# Patient Record
Sex: MALE | Race: OTHER | NOT HISPANIC OR LATINO | URBAN - METROPOLITAN AREA
[De-identification: names, ages, dates, MRNs, and addresses within clinical notes are randomized per-mention and may not be internally consistent; named-entity substitution may affect disease eponyms.]

---

## 2017-01-03 ENCOUNTER — EMERGENCY (EMERGENCY)
Facility: HOSPITAL | Age: 42
LOS: 1 days | Discharge: HOME | End: 2017-01-03
Attending: EMERGENCY MEDICINE
Payer: SELF-PAY

## 2017-01-03 VITALS
WEIGHT: 184.97 LBS | TEMPERATURE: 98 F | RESPIRATION RATE: 16 BRPM | OXYGEN SATURATION: 98 % | HEART RATE: 86 BPM | SYSTOLIC BLOOD PRESSURE: 140 MMHG | HEIGHT: 70 IN | DIASTOLIC BLOOD PRESSURE: 80 MMHG

## 2017-01-03 PROCEDURE — 71010: CPT | Mod: 26

## 2017-01-03 PROCEDURE — 70450 CT HEAD/BRAIN W/O DYE: CPT

## 2017-01-03 PROCEDURE — 71045 X-RAY EXAM CHEST 1 VIEW: CPT

## 2017-01-03 PROCEDURE — 70450 CT HEAD/BRAIN W/O DYE: CPT | Mod: 26

## 2017-01-03 PROCEDURE — 72125 CT NECK SPINE W/O DYE: CPT

## 2017-01-03 PROCEDURE — 99284 EMERGENCY DEPT VISIT MOD MDM: CPT

## 2017-01-03 PROCEDURE — 72125 CT NECK SPINE W/O DYE: CPT | Mod: 26

## 2017-01-03 PROCEDURE — 99284 EMERGENCY DEPT VISIT MOD MDM: CPT | Mod: 25

## 2017-01-03 RX ORDER — ACETAMINOPHEN 500 MG
650 TABLET ORAL ONCE
Qty: 0 | Refills: 0 | Status: COMPLETED | OUTPATIENT
Start: 2017-01-03 | End: 2017-01-03

## 2017-01-03 RX ADMIN — Medication 650 MILLIGRAM(S): at 21:52
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
## 2017-01-03 NOTE — ED PROVIDER NOTE - MEDICAL DECISION MAKING DETAILS
plan if head ct and neck neg and cxr normal plan of r d/c home . concussion talk was given return to ed for intractable HA persitent vomiting or new onset motor or senory deficits. plan if head ct and neck neg and cxr normal plan of r d/c home . concussion talk was given return to ed for intractable HA persitent vomiting or new onset motor or senory deficits.  also had long talk with this patient about need ot f.u read radiology call prominent lung lymph nodes rule out lympohoma vs post infectious this week withotu fail . risk discussed including not detecting cancer if he does not follow this up this week and he voices understanfing. he was provided with clinci f.u for this testing on outpt level he denies fever or weight loss
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
## 2017-01-03 NOTE — ED PROVIDER NOTE - PHYSICAL EXAMINATION
msk/neuro: CN II - XII intact no senory deficits 5/5 ms x 4 extremeties no alteraralizing deficits 2+ dtr globally neg babinski nml cap refill no ataxic gait

## 2019-02-27 ENCOUNTER — EMERGENCY (EMERGENCY)
Facility: HOSPITAL | Age: 44
LOS: 1 days | Discharge: HOME | End: 2019-02-27
Attending: EMERGENCY MEDICINE
Payer: MEDICAID

## 2019-02-27 VITALS
HEART RATE: 76 BPM | WEIGHT: 181 LBS | SYSTOLIC BLOOD PRESSURE: 115 MMHG | TEMPERATURE: 98 F | OXYGEN SATURATION: 98 % | RESPIRATION RATE: 18 BRPM | DIASTOLIC BLOOD PRESSURE: 77 MMHG | HEIGHT: 66 IN

## 2019-02-27 PROCEDURE — 99284 EMERGENCY DEPT VISIT MOD MDM: CPT

## 2019-02-27 PROCEDURE — 70450 CT HEAD/BRAIN W/O DYE: CPT

## 2019-02-27 PROCEDURE — 96374 THER/PROPH/DIAG INJ IV PUSH: CPT

## 2019-02-27 PROCEDURE — 99284 EMERGENCY DEPT VISIT MOD MDM: CPT | Mod: 25

## 2019-02-27 PROCEDURE — 72125 CT NECK SPINE W/O DYE: CPT

## 2019-02-27 RX ORDER — METOCLOPRAMIDE HCL 10 MG
10 TABLET ORAL ONCE
Qty: 0 | Refills: 0 | Status: COMPLETED | OUTPATIENT
Start: 2019-02-27 | End: 2019-02-27

## 2019-02-27 RX ADMIN — Medication 1 TABLET(S): at 23:44

## 2019-02-27 RX ADMIN — Medication 10 MILLIGRAM(S): at 23:44

## 2019-02-27 NOTE — ED PROVIDER NOTE - OBJECTIVE STATEMENT
44 y/o M, with no pertinent medical hx, presents to the ED c/o headache and dizziness, onset yesterday.  Pt notes that 2 days ago he slipped and fell down 8 stairs.  Pt states that during the fall he hit his head.  Denies LOC.  Pt states that since the fall he has been c/o headache, dizziness, and nausea.  Sx are worse with exposure to light.  Denies fever, chills, visual changes, chest pain, SOB, cough, abd pain, vomiting, diarrhea, back pain, neck pain, numbness, or tingling.

## 2019-02-27 NOTE — ED PROVIDER NOTE - NEUROLOGICAL, MLM
Alert and oriented, no focal deficits, no motor or sensory deficits.  Cranial nerves II-XII intact, no nystagmus, sensation intact, reflexes intact, 5/5 strength to extremities x4

## 2019-02-27 NOTE — ED PROVIDER NOTE - NEURO NEGATIVE STATEMENT, MLM
no loss of consciousness, no gait abnormality, + headache, no sensory deficits, and no weakness. +dizziness

## 2019-02-27 NOTE — ED ADULT NURSE NOTE - OBJECTIVE STATEMENT
pt alert and oriented x4 drove to hospital c/o HA, dizzyness and light sensitivity. pt states he fell down 7 wood steps yesterday and hit his head but has not had symptoms until now. respirations even unlabored. pt c/o pain 9/10. pt denies blood thinners. pt educated on plan of care, pt able to successfully teach back plan of care to RN, RN will continue to reeducate pt during hospital stay.
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
## 2019-02-28 VITALS
DIASTOLIC BLOOD PRESSURE: 63 MMHG | SYSTOLIC BLOOD PRESSURE: 98 MMHG | RESPIRATION RATE: 20 BRPM | OXYGEN SATURATION: 99 % | TEMPERATURE: 98 F | HEART RATE: 66 BPM

## 2019-02-28 PROCEDURE — 70450 CT HEAD/BRAIN W/O DYE: CPT | Mod: 26

## 2019-02-28 PROCEDURE — 72125 CT NECK SPINE W/O DYE: CPT | Mod: 26

## 2019-02-28 RX ORDER — METOCLOPRAMIDE HCL 10 MG
1 TABLET ORAL
Qty: 15 | Refills: 0 | OUTPATIENT
Start: 2019-02-28 | End: 2019-03-04

## 2019-03-16 ENCOUNTER — EMERGENCY (EMERGENCY)
Facility: HOSPITAL | Age: 44
LOS: 1 days | Discharge: HOME | End: 2019-03-16
Attending: EMERGENCY MEDICINE
Payer: MEDICAID

## 2019-03-16 VITALS
WEIGHT: 197.98 LBS | RESPIRATION RATE: 18 BRPM | OXYGEN SATURATION: 98 % | HEIGHT: 66 IN | SYSTOLIC BLOOD PRESSURE: 124 MMHG | DIASTOLIC BLOOD PRESSURE: 82 MMHG | HEART RATE: 67 BPM | TEMPERATURE: 98 F

## 2019-03-16 PROCEDURE — 99283 EMERGENCY DEPT VISIT LOW MDM: CPT | Mod: 25

## 2019-03-17 VITALS
SYSTOLIC BLOOD PRESSURE: 125 MMHG | DIASTOLIC BLOOD PRESSURE: 78 MMHG | RESPIRATION RATE: 18 BRPM | TEMPERATURE: 98 F | HEART RATE: 64 BPM | OXYGEN SATURATION: 98 %

## 2019-03-17 PROCEDURE — 99283 EMERGENCY DEPT VISIT LOW MDM: CPT

## 2019-03-17 RX ORDER — METOCLOPRAMIDE HCL 10 MG
10 TABLET ORAL ONCE
Qty: 0 | Refills: 0 | Status: COMPLETED | OUTPATIENT
Start: 2019-03-17 | End: 2019-03-17

## 2019-03-17 RX ADMIN — Medication 10 MILLIGRAM(S): at 04:57

## 2019-03-17 NOTE — ED ADULT NURSE NOTE - OBJECTIVE STATEMENT
patient states that fell last week no follow up care and states needs new medication for pain patient stes continues to have ight sensativity dizziness and headache

## 2019-03-17 NOTE — ED ADULT NURSE NOTE - CAS ELECT INFOMATION PROVIDED
DC instructions/patient given d/c instructions and to follow up with pmd and concussion clinic for further care

## 2019-03-17 NOTE — ED PROVIDER NOTE - CLINICAL SUMMARY MEDICAL DECISION MAKING FREE TEXT BOX
Discussed with pt that he will need to see his pcp for medications, will give reglan and d/c pt with proper follow up

## 2019-03-17 NOTE — ED PROVIDER NOTE - OBJECTIVE STATEMENT
44 y/o male who was recently seen on 2/28 for slip and fall hit the back of his head diagnosed with concussion and d/'d on fioricet and reglan presents to the ED for refills of the meds. States he did not follow up with pcp or neurologist. States he now lives in [facility] and drove to the [facility] for refills of his meds. Pt states his headaches will come occasionally but not as severe. States he would rather take fioricet rather then ibuprofen because it "goes away quicker". Pt seen sleeping in the ED, does not appear to be in any distress.  Denies lightheadedness, dizziness, cp, shortness of breath, blurred vision, loss of vision, visual changes with light, numbness, tingling, fevers

## 2019-03-17 NOTE — ED PROVIDER NOTE - ATTENDING CONTRIBUTION TO CARE
43-year-old With history of headaches has been on Fioricet presents to [facility] with complaints of headache requesting fiorcet   prescription. Outpatient follow-up with pain management and neurologist advised.Plan aNeurology examinationPain control in emergency room.Advice and educated patient prescriptions are not refilled in emergency room.

## 2019-03-21 PROBLEM — Z00.00 ENCOUNTER FOR PREVENTIVE HEALTH EXAMINATION: Status: ACTIVE | Noted: 2019-03-21

## 2019-03-22 ENCOUNTER — APPOINTMENT (OUTPATIENT)
Dept: INTERNAL MEDICINE | Facility: CLINIC | Age: 44
End: 2019-03-22
Payer: SELF-PAY

## 2019-03-22 VITALS — DIASTOLIC BLOOD PRESSURE: 80 MMHG | HEART RATE: 70 BPM | SYSTOLIC BLOOD PRESSURE: 110 MMHG | RESPIRATION RATE: 12 BRPM

## 2019-03-22 VITALS — BODY MASS INDEX: 31.5 KG/M2 | HEIGHT: 66 IN | WEIGHT: 196 LBS

## 2019-03-22 VITALS — DIASTOLIC BLOOD PRESSURE: 85 MMHG | SYSTOLIC BLOOD PRESSURE: 115 MMHG

## 2019-03-22 DIAGNOSIS — R51 HEADACHE: ICD-10-CM

## 2019-03-22 DIAGNOSIS — Z82.49 FAMILY HISTORY OF ISCHEMIC HEART DISEASE AND OTHER DISEASES OF THE CIRCULATORY SYSTEM: ICD-10-CM

## 2019-03-22 DIAGNOSIS — E04.1 NONTOXIC SINGLE THYROID NODULE: ICD-10-CM

## 2019-03-22 DIAGNOSIS — R42 DIZZINESS AND GIDDINESS: ICD-10-CM

## 2019-03-22 PROCEDURE — 36415 COLL VENOUS BLD VENIPUNCTURE: CPT

## 2019-03-22 PROCEDURE — 99203 OFFICE O/P NEW LOW 30 MIN: CPT | Mod: 25

## 2019-03-22 NOTE — ASSESSMENT
[FreeTextEntry1] : Slip and fall 2-19 with ?concussion to the back of the head.  \par Dizziness and lack of confidence when driving.  Mostly ok when not driving.  ABle to have returned to gym and feels good with sweating/exertion.  Entirely non focal neuro exam and normal MS.  Consider re imaging him.  It is possible he has had a SDH but no nausea vomiting or visual change\par Perhaps migraine is an issue here but non focal and no aura. No vision changes. Says the light bothers him at night since incident. \par Thyroid hypodense lesion has enlarged from 1/2017 CT to 2/28/19 CT.  Will need a sono once he has ins. \par Pt says he has no ins and is asking to delay labs or further imaging till 4-19 when he will have coverage.  Advised that he not do this.

## 2019-03-22 NOTE — HEALTH RISK ASSESSMENT
[Fair] :  ~his/her~ mood as fair [Any fall with injury in past year] : Patient reported fall with injury in the past year [1] : 2) Feeling down, depressed, or hopeless for several days (1) [] : No [YearQuit] : 2008 [FreeTextEntry1] : Says he feels depressed since this fall

## 2019-03-22 NOTE — REVIEW OF SYSTEMS
[Negative] : Genitourinary [Chest Pain] : no chest pain [Palpitations] : no palpitations [Lower Ext Edema] : no lower extremity edema [Shortness Of Breath] : no shortness of breath [Wheezing] : no wheezing [Cough] : no cough [Dyspnea on Exertion] : not dyspnea on exertion [Itching] : no itching [Skin Rash] : no skin rash [FreeTextEntry2] : see HPI [FreeTextEntry3] : see HPI [FreeTextEntry7] : see HPI [FreeTextEntry9] : see HPI [de-identified] : see HPI [de-identified] : see HPI

## 2019-03-22 NOTE — HISTORY OF PRESENT ILLNESS
[FreeTextEntry1] : New pt.  Has 2 ER visits.  had slip and fall 2-28.  Imaging no acute fidings except for hypodense lesion neck near thyroid.  Disch on Fiorecet and Reglan\par 2nd visit wrote post concussive sx's and got more Reglan.\marco Says there is a change in his digestion since the original fall.  Feels that it is slow.  \par No nausea vomiting. Describes himself as fit and goes to gym.  \marco Says he is dizzy when he drives.  Says "I don’t feel confident"  Says he has R temporal "numbness"  \par Other wise he is ok and not dizzy except for driving. .  Has returned to gym.  Feels fine when sweating.  \marco Says "I feel lazy and don’t want to do anything" No visual changes but light is bothering him.  Occas problem with sounds.  \marco Has not used Reglan or Fiorecet x 1 week.

## 2019-03-22 NOTE — PHYSICAL EXAM
[No Acute Distress] : no acute distress [Well Nourished] : well nourished [Well Developed] : well developed [PERRL] : pupils equal round and reactive to light [EOMI] : extraocular movements intact [Normal TMs] : both tympanic membranes were normal [Supple] : supple [No Lymphadenopathy] : no lymphadenopathy [Clear to Auscultation] : lungs were clear to auscultation bilaterally [Regular Rhythm] : with a regular rhythm [Normal S1, S2] : normal S1 and S2 [No Murmur] : no murmur heard [No Carotid Bruits] : no carotid bruits [No Edema] : there was no peripheral edema [Normal Appearance] : normal in appearance [Soft] : abdomen soft [Normal Supraclavicular Nodes] : no supraclavicular lymphadenopathy [Normal Posterior Cervical Nodes] : no posterior cervical lymphadenopathy [Normal Anterior Cervical Nodes] : no anterior cervical lymphadenopathy [No Rash] : no rash [No Skin Lesions] : no skin lesions [Normal Gait] : normal gait [Coordination Grossly Intact] : coordination grossly intact [No Focal Deficits] : no focal deficits [Deep Tendon Reflexes (DTR)] : deep tendon reflexes were 2+ and symmetric [Speech Grossly Normal] : speech grossly normal [Memory Grossly Normal] : memory grossly normal [Normal Affect] : the affect was normal [Alert and Oriented x3] : oriented to person, place, and time [Normal Mood] : the mood was normal

## 2019-03-25 ENCOUNTER — RX RENEWAL (OUTPATIENT)
Age: 44
End: 2019-03-25

## 2019-03-25 LAB
ALBUMIN SERPL ELPH-MCNC: 4.7 G/DL
ALP BLD-CCNC: 67 U/L
ALT SERPL-CCNC: 13 U/L
ANION GAP SERPL CALC-SCNC: 15 MMOL/L
AST SERPL-CCNC: 15 U/L
BASOPHILS # BLD AUTO: 0.02 K/UL
BASOPHILS NFR BLD AUTO: 0.3 %
BILIRUB SERPL-MCNC: 0.2 MG/DL
BUN SERPL-MCNC: 16 MG/DL
CALCIUM SERPL-MCNC: 9.5 MG/DL
CHLORIDE SERPL-SCNC: 104 MMOL/L
CO2 SERPL-SCNC: 22 MMOL/L
CREAT SERPL-MCNC: 1.34 MG/DL
EOSINOPHIL # BLD AUTO: 0.28 K/UL
EOSINOPHIL NFR BLD AUTO: 4.8 %
ESTIMATED AVERAGE GLUCOSE: 140 MG/DL
GLUCOSE SERPL-MCNC: 116 MG/DL
HBA1C MFR BLD HPLC: 6.5 %
HCT VFR BLD CALC: 43.4 %
HGB BLD-MCNC: 14.1 G/DL
IMM GRANULOCYTES NFR BLD AUTO: 0.3 %
LYMPHOCYTES # BLD AUTO: 2 K/UL
LYMPHOCYTES NFR BLD AUTO: 34 %
MAN DIFF?: NORMAL
MCHC RBC-ENTMCNC: 27.9 PG
MCHC RBC-ENTMCNC: 32.5 GM/DL
MCV RBC AUTO: 85.8 FL
MONOCYTES # BLD AUTO: 0.61 K/UL
MONOCYTES NFR BLD AUTO: 10.4 %
NEUTROPHILS # BLD AUTO: 2.96 K/UL
NEUTROPHILS NFR BLD AUTO: 50.2 %
PLATELET # BLD AUTO: 269 K/UL
POTASSIUM SERPL-SCNC: 4.3 MMOL/L
PROT SERPL-MCNC: 7.7 G/DL
RBC # BLD: 5.06 M/UL
RBC # FLD: 14.2 %
SODIUM SERPL-SCNC: 141 MMOL/L
T4 SERPL-MCNC: 7.1 UG/DL
TSH SERPL-ACNC: 5.59 UIU/ML
WBC # FLD AUTO: 5.89 K/UL

## 2019-03-25 RX ORDER — MECLIZINE HYDROCHLORIDE 25 MG/1
25 TABLET ORAL 3 TIMES DAILY
Qty: 30 | Refills: 3 | Status: ACTIVE | COMMUNITY
Start: 2019-03-25 | End: 1900-01-01

## 2019-07-21 ENCOUNTER — EMERGENCY (EMERGENCY)
Facility: HOSPITAL | Age: 44
LOS: 1 days | Discharge: HOME | End: 2019-07-21
Attending: EMERGENCY MEDICINE
Payer: MEDICAID

## 2019-07-21 VITALS
WEIGHT: 182.54 LBS | OXYGEN SATURATION: 98 % | TEMPERATURE: 98 F | HEIGHT: 66 IN | HEART RATE: 75 BPM | SYSTOLIC BLOOD PRESSURE: 131 MMHG | DIASTOLIC BLOOD PRESSURE: 77 MMHG | RESPIRATION RATE: 20 BRPM

## 2019-07-21 PROCEDURE — 99284 EMERGENCY DEPT VISIT MOD MDM: CPT

## 2019-07-21 PROCEDURE — 73630 X-RAY EXAM OF FOOT: CPT

## 2019-07-21 PROCEDURE — 73630 X-RAY EXAM OF FOOT: CPT | Mod: 26,50

## 2019-07-21 PROCEDURE — 99283 EMERGENCY DEPT VISIT LOW MDM: CPT

## 2019-07-21 RX ORDER — IBUPROFEN 200 MG
800 TABLET ORAL ONCE
Refills: 0 | Status: DISCONTINUED | OUTPATIENT
Start: 2019-07-21 | End: 2019-07-26

## 2019-07-21 NOTE — ED ADULT TRIAGE NOTE - CHIEF COMPLAINT QUOTE
Pt jumped off of ladder, bare foot, and now both feet hurt more so the left foot, ambulated into triage without difficulty

## 2019-07-21 NOTE — ED STATDOCS - ATTENDING CONTRIBUTION TO CARE
I, Stanislaw George, performed the initial face to face bedside interview with this patient regarding history of present illness, review of symptoms and relevant past medical, social and family history.  I completed an independent physical examination.  I was the initial provider who evaluated this patient. I have signed out the follow up of any pending tests (i.e. labs, radiological studies) to the ACP.  I have communicated the patient’s plan of care and disposition with the ACP.  The history, relevant review of systems, past medical and surgical history, medical decision making, and physical examination was documented by the scribe in my presence and I attest to the accuracy of the documentation.

## 2019-07-21 NOTE — ED STATDOCS - CLINICAL SUMMARY MEDICAL DECISION MAKING FREE TEXT BOX
45 y/o male s/p fall from ladder presents to ED c/o foot pain. Pt is able to ambulate, non-tender to palpation. Will x-ray, likely discharge.

## 2019-07-21 NOTE — ED STATDOCS - OBJECTIVE STATEMENT
43 y/o M pt with no significant PMHx presents to ED c/o bilateral foot pain s/p jumping off a shaky ladder earlier this morning. Pt reports being about 3-4 feet up on a ladder, and jumped off, barefoot, onto a hard surface. Left foot pain is greater than right. He is able to ambulate, and came to the ED for the pain. Denies Pham, CP, SOB, and low back pain. NKDA. No further complaints at this time.

## 2019-10-15 ENCOUNTER — EMERGENCY (EMERGENCY)
Facility: HOSPITAL | Age: 44
LOS: 1 days | Discharge: HOME | End: 2019-10-15
Attending: EMERGENCY MEDICINE
Payer: MEDICAID

## 2019-10-15 VITALS
HEART RATE: 56 BPM | SYSTOLIC BLOOD PRESSURE: 115 MMHG | OXYGEN SATURATION: 99 % | TEMPERATURE: 98 F | RESPIRATION RATE: 18 BRPM | DIASTOLIC BLOOD PRESSURE: 71 MMHG

## 2019-10-15 VITALS
WEIGHT: 179.9 LBS | TEMPERATURE: 98 F | OXYGEN SATURATION: 97 % | DIASTOLIC BLOOD PRESSURE: 82 MMHG | HEART RATE: 92 BPM | SYSTOLIC BLOOD PRESSURE: 124 MMHG | RESPIRATION RATE: 18 BRPM | HEIGHT: 66 IN

## 2019-10-15 LAB
ALBUMIN SERPL ELPH-MCNC: 3.9 G/DL — SIGNIFICANT CHANGE UP (ref 3.3–5.2)
ALP SERPL-CCNC: 57 U/L — SIGNIFICANT CHANGE UP (ref 40–120)
ALT FLD-CCNC: 9 U/L — SIGNIFICANT CHANGE UP
ANION GAP SERPL CALC-SCNC: 12 MMOL/L — SIGNIFICANT CHANGE UP (ref 5–17)
AST SERPL-CCNC: 13 U/L — SIGNIFICANT CHANGE UP
BASOPHILS # BLD AUTO: 0.03 K/UL — SIGNIFICANT CHANGE UP (ref 0–0.2)
BASOPHILS NFR BLD AUTO: 0.4 % — SIGNIFICANT CHANGE UP (ref 0–2)
BILIRUB SERPL-MCNC: <0.2 MG/DL — LOW (ref 0.4–2)
BUN SERPL-MCNC: 20 MG/DL — SIGNIFICANT CHANGE UP (ref 8–20)
CALCIUM SERPL-MCNC: 9 MG/DL — SIGNIFICANT CHANGE UP (ref 8.6–10.2)
CHLORIDE SERPL-SCNC: 102 MMOL/L — SIGNIFICANT CHANGE UP (ref 98–107)
CK SERPL-CCNC: 96 U/L — SIGNIFICANT CHANGE UP (ref 30–200)
CO2 SERPL-SCNC: 24 MMOL/L — SIGNIFICANT CHANGE UP (ref 22–29)
CREAT SERPL-MCNC: 1.19 MG/DL — SIGNIFICANT CHANGE UP (ref 0.5–1.3)
EOSINOPHIL # BLD AUTO: 0.39 K/UL — SIGNIFICANT CHANGE UP (ref 0–0.5)
EOSINOPHIL NFR BLD AUTO: 5.1 % — SIGNIFICANT CHANGE UP (ref 0–6)
GLUCOSE SERPL-MCNC: 112 MG/DL — SIGNIFICANT CHANGE UP (ref 70–115)
HCT VFR BLD CALC: 38.5 % — LOW (ref 39–50)
HGB BLD-MCNC: 13 G/DL — SIGNIFICANT CHANGE UP (ref 13–17)
IMM GRANULOCYTES NFR BLD AUTO: 0.1 % — SIGNIFICANT CHANGE UP (ref 0–1.5)
LYMPHOCYTES # BLD AUTO: 2.33 K/UL — SIGNIFICANT CHANGE UP (ref 1–3.3)
LYMPHOCYTES # BLD AUTO: 30.5 % — SIGNIFICANT CHANGE UP (ref 13–44)
MCHC RBC-ENTMCNC: 28.8 PG — SIGNIFICANT CHANGE UP (ref 27–34)
MCHC RBC-ENTMCNC: 33.8 GM/DL — SIGNIFICANT CHANGE UP (ref 32–36)
MCV RBC AUTO: 85.4 FL — SIGNIFICANT CHANGE UP (ref 80–100)
MONOCYTES # BLD AUTO: 0.66 K/UL — SIGNIFICANT CHANGE UP (ref 0–0.9)
MONOCYTES NFR BLD AUTO: 8.6 % — SIGNIFICANT CHANGE UP (ref 2–14)
NEUTROPHILS # BLD AUTO: 4.23 K/UL — SIGNIFICANT CHANGE UP (ref 1.8–7.4)
NEUTROPHILS NFR BLD AUTO: 55.3 % — SIGNIFICANT CHANGE UP (ref 43–77)
PLATELET # BLD AUTO: 229 K/UL — SIGNIFICANT CHANGE UP (ref 150–400)
POTASSIUM SERPL-MCNC: 4.4 MMOL/L — SIGNIFICANT CHANGE UP (ref 3.5–5.3)
POTASSIUM SERPL-SCNC: 4.4 MMOL/L — SIGNIFICANT CHANGE UP (ref 3.5–5.3)
PROT SERPL-MCNC: 7 G/DL — SIGNIFICANT CHANGE UP (ref 6.6–8.7)
RBC # BLD: 4.51 M/UL — SIGNIFICANT CHANGE UP (ref 4.2–5.8)
RBC # FLD: 12.7 % — SIGNIFICANT CHANGE UP (ref 10.3–14.5)
SODIUM SERPL-SCNC: 138 MMOL/L — SIGNIFICANT CHANGE UP (ref 135–145)
TROPONIN T SERPL-MCNC: <0.01 NG/ML — SIGNIFICANT CHANGE UP (ref 0–0.06)
TROPONIN T SERPL-MCNC: <0.01 NG/ML — SIGNIFICANT CHANGE UP (ref 0–0.06)
WBC # BLD: 7.65 K/UL — SIGNIFICANT CHANGE UP (ref 3.8–10.5)
WBC # FLD AUTO: 7.65 K/UL — SIGNIFICANT CHANGE UP (ref 3.8–10.5)

## 2019-10-15 PROCEDURE — 99236 HOSP IP/OBS SAME DATE HI 85: CPT

## 2019-10-15 PROCEDURE — 71045 X-RAY EXAM CHEST 1 VIEW: CPT | Mod: 26

## 2019-10-15 PROCEDURE — 75574 CT ANGIO HRT W/3D IMAGE: CPT | Mod: 26

## 2019-10-15 PROCEDURE — 85027 COMPLETE CBC AUTOMATED: CPT

## 2019-10-15 PROCEDURE — G0378: CPT

## 2019-10-15 PROCEDURE — 71045 X-RAY EXAM CHEST 1 VIEW: CPT

## 2019-10-15 PROCEDURE — 93010 ELECTROCARDIOGRAM REPORT: CPT

## 2019-10-15 PROCEDURE — 84484 ASSAY OF TROPONIN QUANT: CPT

## 2019-10-15 PROCEDURE — 80053 COMPREHEN METABOLIC PANEL: CPT

## 2019-10-15 PROCEDURE — 93005 ELECTROCARDIOGRAM TRACING: CPT

## 2019-10-15 PROCEDURE — 36415 COLL VENOUS BLD VENIPUNCTURE: CPT

## 2019-10-15 PROCEDURE — 93010 ELECTROCARDIOGRAM REPORT: CPT | Mod: 77

## 2019-10-15 PROCEDURE — 99284 EMERGENCY DEPT VISIT MOD MDM: CPT | Mod: 25

## 2019-10-15 PROCEDURE — 75574 CT ANGIO HRT W/3D IMAGE: CPT

## 2019-10-15 PROCEDURE — 82550 ASSAY OF CK (CPK): CPT

## 2019-10-15 RX ORDER — METOPROLOL TARTRATE 50 MG
100 TABLET ORAL ONCE
Refills: 0 | Status: COMPLETED | OUTPATIENT
Start: 2019-10-15 | End: 2019-10-15

## 2019-10-15 RX ORDER — ATORVASTATIN CALCIUM 80 MG/1
1 TABLET, FILM COATED ORAL
Qty: 30 | Refills: 0
Start: 2019-10-15 | End: 2019-11-13

## 2019-10-15 NOTE — ED ADULT NURSE NOTE - OBJECTIVE STATEMENT
Assumed pt care, pt EKG preformed prior to assuming care, pt is A+Ox4, on cardiac monitor at this time, no acute distress noted. Pt comes in complaining of midsternal chest pain with radiation to L shoulder. Pt states the pain is waxing and waning, worse at times, at this exact time pt states pain is 8/10 and describes pain as "sharp." Pt denies SOB/respirations are spontaneous even and unlabored. Pt is independent and ambulatory with steady gait. Pt denies any medical hx or cardiac hx. Assumed pt care, pt EKG preformed prior to assuming care, pt is A+Ox4, on cardiac monitor at this time, no acute distress noted. Pt comes in complaining of midsternal chest pain with radiation to L shoulder for the last two hours. Pt states the pain is waxing and waning, worse at times, at this exact time pt states pain is 8/10 and describes pain as "sharp." Pt has never seen a cardiologist. Pt denies SOB/respirations are spontaneous even and unlabored. Pt is independent and ambulatory with steady gait. Pt denies any medical hx or cardiac hx.

## 2019-10-15 NOTE — ED ADULT NURSE REASSESSMENT NOTE - NS ED NURSE REASSESS COMMENT FT1
verbal report received from ED RN Adrianna, pt now in CDU for observation
pt care assumed at 0740, no apparent distress noted at this time, charting as noted. pt received Alert and Oriented to person, place, situation and time resting in  bed comfortably. pt denies chest pain at this time. pt is NSR on cardiac monitor. pt awaiting CTA and cardiology consult. pt educated on plan of care, pt able to successfully teach back plan of care to RN, RN will continue to reeducate pt during hospital stay.

## 2019-10-15 NOTE — ED CDU PROVIDER DISPOSITION NOTE - PATIENT PORTAL LINK FT
You can access the FollowMyHealth Patient Portal offered by [facility] by registering at the following website: [email or web address]. By joining [facility]’s FollowMyHealth portal, you will also be able to view your health information using other applications (apps) compatible with our system.

## 2019-10-15 NOTE — ED CDU PROVIDER DISPOSITION NOTE - ATTENDING CONTRIBUTION TO CARE
I, Stanislaw George, performed the initial face to face bedside interview with this patient regarding history of present illness, review of symptoms and relevant past medical, social and family history.  I completed an independent physical examination.  I was the initial provider who evaluated this patient. I have signed out the follow up of any pending tests (i.e. labs, radiological studies) to the ACP.  I have communicated the patient’s plan of care and disposition with the ACP.

## 2019-10-15 NOTE — ED CDU PROVIDER DISPOSITION NOTE - CLINICAL COURSE
Patient is a 45 y/o male with no pmhx presenting with midsternal chest pain that started two hours prior to arrival to the ED, pt stating pain radiating to the left shoulder. Patient denies injuries or trauma, denies cardiac hx. +family history heart attack mother and father in 60s. pt nonsmoker.  Has not had recurrence of CP or shoulder pain.  Troponin negative x 2, EKG no ischemia,  CTA with some plaque formation.  I s/w Dr Meng Otero he reviewed results and recommends lipitor 20 mg daily, and a aspirin 81 mg daily, no exercise and f/u in office 1-2 weeks.

## 2019-10-15 NOTE — ED CDU PROVIDER INITIAL DAY NOTE - OBJECTIVE STATEMENT
Patient is a 45 y/o male with no pmhx presenting with midsternal chest pain that started two hours prior to arrival to the ED, pt stating pain radiating to the left shoulder. Patient denies injuries or trauma, denies cardiac hx. +family history heart attack mother and father in 60s. pt nonsmoker. Patient denies sob, palpitations, recent URI, abd pain, nausea, vomiting, diarrhea.

## 2019-10-15 NOTE — ED PROVIDER NOTE - PHYSICAL EXAMINATION
Const: Awake, alert and oriented. In no acute distress. Well appearing.  HEENT: NC/AT. Moist mucous membranes.  .Neck:. Soft and supple. Full ROM without pain.  Cardiac: +S1/S2. No murmurs. Peripheral pulses 2+ and symmetric. No LE edema.  Resp: Speaking in full sentences. No chest wall tenderness on palpation, No evidence of respiratory distress. No wheezes, rales or rhonchi.  Abd: Soft, non-tender, non-distended. Normal bowel sounds in all 4 quadrants. No guarding or rebound.  Back: Spine midline and non-tender. No CVAT.  Skin: No rashes, abrasions or lacerations.  Lymph: No cervical lymphadenopathy.  Neuro: Awake, alert & oriented x 3. Moves all extremities symmetrically.

## 2019-10-15 NOTE — ED PROVIDER NOTE - ATTENDING CONTRIBUTION TO CARE
45 yo M presents to ED c/o sudden onset of L sided chest pain which awoke form sleep 2 hrs ago,  Pt denies any PMH, but both mother and father  in their 60's of coronary ds.  No assoc SOB, N/V, diaphoresis or syncope.  EG sinus with no acute changes.  On exam pt awake and alert in NAD.  mm moist, Neck supple, no JVD, Cor Reg., Lungs clear b/l, abd soft, NT, Ext FROM, no edema, Neuro no focal deficits.  Will place on OBS, check serial CE/EKG and check cardiac CTA

## 2019-10-15 NOTE — ED PROVIDER NOTE - OBJECTIVE STATEMENT
Patient is a 43 y/o male with no pmhx presenting with midsternal chest pain that started two hours prior to arrival to the ED, pt stating pain radiating to the left shoulder. Patient denies injuries or trauma, denies cardiac hx. +family history heart attack mother and father in 60s. pt nonsmoker. Patient denies sob, palpitations, recent URI, abd pain, nausea, vomiting, diarrhea.

## 2020-08-20 ENCOUNTER — EMERGENCY (EMERGENCY)
Facility: HOSPITAL | Age: 45
LOS: 1 days | Discharge: HOME | End: 2020-08-20
Attending: EMERGENCY MEDICINE
Payer: MEDICAID

## 2020-08-20 VITALS
OXYGEN SATURATION: 98 % | RESPIRATION RATE: 20 BRPM | SYSTOLIC BLOOD PRESSURE: 137 MMHG | TEMPERATURE: 99 F | WEIGHT: 192.9 LBS | HEART RATE: 70 BPM | HEIGHT: 67 IN | DIASTOLIC BLOOD PRESSURE: 92 MMHG

## 2020-08-20 VITALS
RESPIRATION RATE: 20 BRPM | HEART RATE: 53 BPM | SYSTOLIC BLOOD PRESSURE: 134 MMHG | DIASTOLIC BLOOD PRESSURE: 88 MMHG | OXYGEN SATURATION: 100 % | TEMPERATURE: 100 F

## 2020-08-20 LAB
ALBUMIN SERPL ELPH-MCNC: 4 G/DL — SIGNIFICANT CHANGE UP (ref 3.3–5.2)
ALP SERPL-CCNC: 64 U/L — SIGNIFICANT CHANGE UP (ref 40–120)
ALT FLD-CCNC: 22 U/L — SIGNIFICANT CHANGE UP
ANION GAP SERPL CALC-SCNC: 11 MMOL/L — SIGNIFICANT CHANGE UP (ref 5–17)
APTT BLD: 28.4 SEC — SIGNIFICANT CHANGE UP (ref 27.5–35.5)
AST SERPL-CCNC: 19 U/L — SIGNIFICANT CHANGE UP
BASOPHILS # BLD AUTO: 0.02 K/UL — SIGNIFICANT CHANGE UP (ref 0–0.2)
BASOPHILS NFR BLD AUTO: 0.2 % — SIGNIFICANT CHANGE UP (ref 0–2)
BILIRUB SERPL-MCNC: 0.5 MG/DL — SIGNIFICANT CHANGE UP (ref 0.4–2)
BUN SERPL-MCNC: 17 MG/DL — SIGNIFICANT CHANGE UP (ref 8–20)
CALCIUM SERPL-MCNC: 9.1 MG/DL — SIGNIFICANT CHANGE UP (ref 8.6–10.2)
CHLORIDE SERPL-SCNC: 98 MMOL/L — SIGNIFICANT CHANGE UP (ref 98–107)
CO2 SERPL-SCNC: 28 MMOL/L — SIGNIFICANT CHANGE UP (ref 22–29)
CREAT SERPL-MCNC: 0.99 MG/DL — SIGNIFICANT CHANGE UP (ref 0.5–1.3)
EOSINOPHIL # BLD AUTO: 0.46 K/UL — SIGNIFICANT CHANGE UP (ref 0–0.5)
EOSINOPHIL NFR BLD AUTO: 4.4 % — SIGNIFICANT CHANGE UP (ref 0–6)
GLUCOSE SERPL-MCNC: 134 MG/DL — HIGH (ref 70–99)
HCT VFR BLD CALC: 42.3 % — SIGNIFICANT CHANGE UP (ref 39–50)
HGB BLD-MCNC: 14.6 G/DL — SIGNIFICANT CHANGE UP (ref 13–17)
HIV 1 & 2 AB SERPL IA.RAPID: SIGNIFICANT CHANGE UP
IMM GRANULOCYTES NFR BLD AUTO: 0.5 % — SIGNIFICANT CHANGE UP (ref 0–1.5)
INR BLD: 1.05 RATIO — SIGNIFICANT CHANGE UP (ref 0.88–1.16)
LYMPHOCYTES # BLD AUTO: 2.42 K/UL — SIGNIFICANT CHANGE UP (ref 1–3.3)
LYMPHOCYTES # BLD AUTO: 23 % — SIGNIFICANT CHANGE UP (ref 13–44)
MAGNESIUM SERPL-MCNC: 2.3 MG/DL — SIGNIFICANT CHANGE UP (ref 1.6–2.6)
MCHC RBC-ENTMCNC: 29.3 PG — SIGNIFICANT CHANGE UP (ref 27–34)
MCHC RBC-ENTMCNC: 34.5 GM/DL — SIGNIFICANT CHANGE UP (ref 32–36)
MCV RBC AUTO: 84.9 FL — SIGNIFICANT CHANGE UP (ref 80–100)
MONOCYTES # BLD AUTO: 0.77 K/UL — SIGNIFICANT CHANGE UP (ref 0–0.9)
MONOCYTES NFR BLD AUTO: 7.3 % — SIGNIFICANT CHANGE UP (ref 2–14)
NEUTROPHILS # BLD AUTO: 6.79 K/UL — SIGNIFICANT CHANGE UP (ref 1.8–7.4)
NEUTROPHILS NFR BLD AUTO: 64.6 % — SIGNIFICANT CHANGE UP (ref 43–77)
NT-PROBNP SERPL-SCNC: 12 PG/ML — SIGNIFICANT CHANGE UP (ref 0–300)
PLATELET # BLD AUTO: 267 K/UL — SIGNIFICANT CHANGE UP (ref 150–400)
POTASSIUM SERPL-MCNC: 5 MMOL/L — SIGNIFICANT CHANGE UP (ref 3.5–5.3)
POTASSIUM SERPL-SCNC: 5 MMOL/L — SIGNIFICANT CHANGE UP (ref 3.5–5.3)
PROT SERPL-MCNC: 7.1 G/DL — SIGNIFICANT CHANGE UP (ref 6.6–8.7)
PROTHROM AB SERPL-ACNC: 12.1 SEC — SIGNIFICANT CHANGE UP (ref 10.6–13.6)
RBC # BLD: 4.98 M/UL — SIGNIFICANT CHANGE UP (ref 4.2–5.8)
RBC # FLD: 12.4 % — SIGNIFICANT CHANGE UP (ref 10.3–14.5)
SODIUM SERPL-SCNC: 137 MMOL/L — SIGNIFICANT CHANGE UP (ref 135–145)
TROPONIN T SERPL-MCNC: <0.01 NG/ML — SIGNIFICANT CHANGE UP (ref 0–0.06)
TROPONIN T SERPL-MCNC: <0.01 NG/ML — SIGNIFICANT CHANGE UP (ref 0–0.06)
WBC # BLD: 10.51 K/UL — HIGH (ref 3.8–10.5)
WBC # FLD AUTO: 10.51 K/UL — HIGH (ref 3.8–10.5)

## 2020-08-20 PROCEDURE — 85730 THROMBOPLASTIN TIME PARTIAL: CPT

## 2020-08-20 PROCEDURE — 85027 COMPLETE CBC AUTOMATED: CPT

## 2020-08-20 PROCEDURE — 84484 ASSAY OF TROPONIN QUANT: CPT

## 2020-08-20 PROCEDURE — A9500: CPT

## 2020-08-20 PROCEDURE — 93018 CV STRESS TEST I&R ONLY: CPT

## 2020-08-20 PROCEDURE — 71045 X-RAY EXAM CHEST 1 VIEW: CPT

## 2020-08-20 PROCEDURE — 80053 COMPREHEN METABOLIC PANEL: CPT

## 2020-08-20 PROCEDURE — 71045 X-RAY EXAM CHEST 1 VIEW: CPT | Mod: 26

## 2020-08-20 PROCEDURE — G0378: CPT

## 2020-08-20 PROCEDURE — 93010 ELECTROCARDIOGRAM REPORT: CPT

## 2020-08-20 PROCEDURE — 99285 EMERGENCY DEPT VISIT HI MDM: CPT

## 2020-08-20 PROCEDURE — 86703 HIV-1/HIV-2 1 RESULT ANTBDY: CPT

## 2020-08-20 PROCEDURE — 83880 ASSAY OF NATRIURETIC PEPTIDE: CPT

## 2020-08-20 PROCEDURE — 93016 CV STRESS TEST SUPVJ ONLY: CPT

## 2020-08-20 PROCEDURE — 93005 ELECTROCARDIOGRAM TRACING: CPT

## 2020-08-20 PROCEDURE — 99284 EMERGENCY DEPT VISIT MOD MDM: CPT | Mod: 25

## 2020-08-20 PROCEDURE — 85610 PROTHROMBIN TIME: CPT

## 2020-08-20 PROCEDURE — 78452 HT MUSCLE IMAGE SPECT MULT: CPT

## 2020-08-20 PROCEDURE — 93017 CV STRESS TEST TRACING ONLY: CPT

## 2020-08-20 PROCEDURE — 83735 ASSAY OF MAGNESIUM: CPT

## 2020-08-20 PROCEDURE — 78452 HT MUSCLE IMAGE SPECT MULT: CPT | Mod: 26

## 2020-08-20 PROCEDURE — 99218: CPT

## 2020-08-20 PROCEDURE — 36415 COLL VENOUS BLD VENIPUNCTURE: CPT

## 2020-08-20 RX ORDER — ATORVASTATIN CALCIUM 80 MG/1
20 TABLET, FILM COATED ORAL AT BEDTIME
Refills: 0 | Status: DISCONTINUED | OUTPATIENT
Start: 2020-08-20 | End: 2020-08-25

## 2020-08-20 RX ORDER — ASPIRIN/CALCIUM CARB/MAGNESIUM 324 MG
81 TABLET ORAL DAILY
Refills: 0 | Status: DISCONTINUED | OUTPATIENT
Start: 2020-08-20 | End: 2020-08-25

## 2020-08-20 RX ORDER — METOPROLOL TARTRATE 50 MG
100 TABLET ORAL ONCE
Refills: 0 | Status: DISCONTINUED | OUTPATIENT
Start: 2020-08-20 | End: 2020-08-20

## 2020-08-20 RX ORDER — ATORVASTATIN CALCIUM 80 MG/1
40 TABLET, FILM COATED ORAL AT BEDTIME
Refills: 0 | Status: DISCONTINUED | OUTPATIENT
Start: 2020-08-20 | End: 2020-08-20

## 2020-08-20 RX ORDER — ATORVASTATIN CALCIUM 80 MG/1
1 TABLET, FILM COATED ORAL
Qty: 30 | Refills: 0
Start: 2020-08-20 | End: 2020-09-18

## 2020-08-20 RX ORDER — ASPIRIN/CALCIUM CARB/MAGNESIUM 324 MG
1 TABLET ORAL
Qty: 30 | Refills: 0
Start: 2020-08-20 | End: 2020-09-18

## 2020-08-20 NOTE — ED CDU PROVIDER DISPOSITION NOTE - NSFOLLOWUPCLINICS_GEN_ALL_ED_FT
[facility]  Cardiology  [address] [place] [address]  Phone: [phone]  Fax:   Follow Up Time:

## 2020-08-20 NOTE — ED PROVIDER NOTE - OBJECTIVE STATEMENT
45yoM; with pmh signif for HLD; now p/w chest pain--non-exertional, non-pleuritic, left sided chest pain, sscp, radiating to left arm, associated with nausea, diaphoresis, palpitations, lightheadedness, lasting 30min. reports similar episode in past. had cardiac ct in october showing mild-moderate LAD disease.  denies f/c/s. denies cough. denies abd pain. denies n/v.  PMH: HLD  SOCIAL: No tobacco/illicit substance use?EtOH

## 2020-08-20 NOTE — ED CDU PROVIDER DISPOSITION NOTE - ATTENDING CONTRIBUTION TO CARE
seen with acp  in observation for CP eval  stress test and other tests negative   will d/c for outpt fu  agree with care

## 2020-08-20 NOTE — ED ADULT NURSE NOTE - NSIMPLEMENTINTERV_GEN_ALL_ED
Implemented All Universal Safety Interventions:  [place] to call system. Call bell, personal items and telephone within reach. Instruct patient to call for assistance. Room bathroom lighting operational. Non-slip footwear when patient is off stretcher. Physically safe environment: no spills, clutter or unnecessary equipment. Stretcher in lowest position, wheels locked, appropriate side rails in place.

## 2020-08-20 NOTE — CONSULT NOTE ADULT - SUBJECTIVE AND OBJECTIVE BOX
[place] [facility]                                                               Office:  [address] [address]                                                              Telephone: [phone]. Fax:[phone]                                                                        CARDIOLOGY CONSULTATION NOTE                                                                                             Consult requested by:  Dr. Fan  Reason for Consultation: Chest Pain  History obtained by: Patient and medical record   obtained: No    Chief complaint:    Patient is a 45y old  Male who presents with a chief complaint of chest pain      HPI: pt is a 44 y/o male with medical history of         REVIEW OF SYMPTOMS:     CONSTITUTIONAL: No fever, weight loss, or fatigue  ENMT:  No difficulty hearing, tinnitus, vertigo; No sinus or throat pain  NECK: No pain or stiffness  CARDIOVASCULAR: See HPI  RESPIRATORY: No Dyspnea on exertion, Shortness of breath, cough, wheezing  : No dysuria, no hematuria   GI: No dark color stool, no melena, no diarrhea, no constipation, no abdominal pain   NEURO: No headache, no dizziness, no slurred speech   MUSCULOSKELETAL: No joint pain or swelling; No muscle, back, or extremity pain  PSYCH: No agitation, no anxiety.    ALL OTHER REVIEW OF SYSTEMS ARE NEGATIVE.      ALLERGIES: Allergies    No Known Allergies    Intolerances      PAST MEDICAL HISTORY  No pertinent past medical history  No pertinent past medical history      PAST SURGICAL HISTORY  No significant past surgical history  No significant past surgical history      FAMILY HISTORY:      SOCIAL HISTORY:  Denies smoking/alcohol/drugs  CIGARETTES:     ALCOHOL:  DRUGS:      CURRENT MEDICATIONS:           HOME MEDICATIONS:        Vital Signs Last 24 Hrs  T(C): 37 (20 Aug 2020 08:18), Max: 37 (20 Aug 2020 08:18)  T(F): 98.6 (20 Aug 2020 08:18), Max: 98.6 (20 Aug 2020 08:18)  HR: 70 (20 Aug 2020 08:18) (70 - 70)  BP: 137/92 (20 Aug 2020 08:18) (137/92 - 137/92)  RR: 20 (20 Aug 2020 08:18) (20 - 20)  SpO2: 98% (20 Aug 2020 08:18) (98% - 98%)      PHYSICAL EXAM:  Constitutional: Comfortable . No acute distress.   HEENT: Atraumatic and normocephalic , neck is supple . no JVD. No carotid bruit. PEERL   CNS: A&Ox3. No focal deficits. EOMI.    Lymph Nodes: Cervical : Not palpable.  Respiratory: CTAB  Cardiovascular: S1S2 RRR. No murmur/rubs or gallop.  Gastrointestinal: Soft non-tender and non distended . +Bowel sounds. negative Lopez's sign.  Extremities: No edema.   Psychiatric: Calm . no agitation.  Skin: No skin rash/ulcers visualized to face, hands or feet.    Intake and output:     LABS:                        14.6   10.51 )-----------( 267      ( 20 Aug 2020 09:57 )             42.3     08-20    137  |  98  |  17.0  ----------------------------<  134<H>  5.0   |  28.0  |  0.99    Ca    9.1      20 Aug 2020 09:57  Mg     2.3     08-20    TPro  7.1  /  Alb  4.0  /  TBili  0.5  /  DBili  x   /  AST  19  /  ALT  22  /  AlkPhos  64  08-20    CARDIAC MARKERS ( 20 Aug 2020 09:57 )  x     / <0.01 ng/mL / x     / x     / x        ;p-BNP=Serum Pro-Brain Natriuretic Peptide: 12 pg/mL (08-20 @ 09:57)    PT/INR - ( 20 Aug 2020 09:57 )   PT: 12.1 sec;   INR: 1.05 ratio         PTT - ( 20 Aug 2020 09:57 )  PTT:28.4 sec      INTERPRETATION OF TELEMETRY:   ECG: NSR HR @ 66     RADIOLOGY & ADDITIONAL STUDIES:    X-ray:    CT scan:   MRI: [place] [facility]                                                               Office:  [address] [place] [address]                                                              Telephone: [phone]. Fax:[phone]                                                                        CARDIOLOGY CONSULTATION NOTE                                                                                             Consult requested by:  Dr. Fan  Reason for Consultation: Chest Pain  History obtained by: Patient and medical record   obtained: No    Chief complaint:    Patient is a 45y old  Male who presents with a chief complaint of chest pain      HPI: pt is a 46 y/o male with medical history of nonobstructive CAD (CTA 10/2019 mod CAD) , HLD presented with c/o CP started at 8: 30 am today, left sided, moderate intensity, felt like pressure, radiated to left arm, associated with rapid heart beats, lasted 15 minutes. Pt got scared and came to ER. The CP nonexertional. Denied dyspnea, orthopnea, pnd, edema nausea, vomiting hematuria melena syncope, near syncope.         REVIEW OF SYMPTOMS:     CONSTITUTIONAL: No fever, weight loss, or fatigue  ENMT:  No difficulty hearing, tinnitus, vertigo; No sinus or throat pain  NECK: No pain or stiffness  CARDIOVASCULAR: See HPI  RESPIRATORY: No Dyspnea on exertion, Shortness of breath, cough, wheezing  : No dysuria, no hematuria   GI: No dark color stool, no melena, no diarrhea, no constipation, no abdominal pain   NEURO: No headache, no dizziness, no slurred speech   MUSCULOSKELETAL: No joint pain or swelling; No muscle, back, or extremity pain  PSYCH: No agitation, no anxiety.    ALL OTHER REVIEW OF SYSTEMS ARE NEGATIVE.      ALLERGIES: Allergies    No Known Allergies    Intolerances      PAST MEDICAL HISTORY  CAD  HLD        PAST SURGICAL HISTORY  No significant past surgical history  No significant past surgical history      FAMILY HISTORY:  Noncontirbutory    SOCIAL HISTORY:  Denies smoking/alcohol/drugs        CURRENT MEDICATIONS:  Atorvastatin  ASA 81mg daily         Vital Signs Last 24 Hrs  T(C): 37 (20 Aug 2020 08:18), Max: 37 (20 Aug 2020 08:18)  T(F): 98.6 (20 Aug 2020 08:18), Max: 98.6 (20 Aug 2020 08:18)  HR: 70 (20 Aug 2020 08:18) (70 - 70)  BP: 137/92 (20 Aug 2020 08:18) (137/92 - 137/92)  RR: 20 (20 Aug 2020 08:18) (20 - 20)  SpO2: 98% (20 Aug 2020 08:18) (98% - 98%)      PHYSICAL EXAM:  Constitutional: Comfortable . No acute distress.   HEENT: Atraumatic and normocephalic , neck is supple . no JVD. No carotid bruit. PEERL   CNS: A&Ox3. No focal deficits. EOMI.    Lymph Nodes: Cervical : Not palpable.  Respiratory: CTAB  Cardiovascular: S1S2 RRR. No murmur/rubs or gallop.  Gastrointestinal: Soft non-tender and non distended . +Bowel sounds. negative Lopez's sign.  Extremities: No edema.   Psychiatric: Calm . no agitation.  Skin: No skin rash/ulcers visualized to face, hands or feet.    Intake and output:     LABS:                        14.6   10.51 )-----------( 267      ( 20 Aug 2020 09:57 )             42.3     08-20    137  |  98  |  17.0  ----------------------------<  134<H>  5.0   |  28.0  |  0.99    Ca    9.1      20 Aug 2020 09:57  Mg     2.3     08-20    TPro  7.1  /  Alb  4.0  /  TBili  0.5  /  DBili  x   /  AST  19  /  ALT  22  /  AlkPhos  64  08-20    CARDIAC MARKERS ( 20 Aug 2020 09:57 )  x     / <0.01 ng/mL / x     / x     / x        ;p-BNP=Serum Pro-Brain Natriuretic Peptide: 12 pg/mL (08-20 @ 09:57)    PT/INR - ( 20 Aug 2020 09:57 )   PT: 12.1 sec;   INR: 1.05 ratio         PTT - ( 20 Aug 2020 09:57 )  PTT:28.4 sec      INTERPRETATION OF TELEMETRY:   ECG: NSR HR @ 66         < from: CT Angio Cardiac w/ IV Cont (10.15.19 @ 13:01) >    FINDINGS:    VISUALIZED LUNGS: Clear  MEDIASTINUM:  no significant mediastinal adenopathy.  BONES:  grossly unremarkable   AORTA: No thoracic aortic dissection is noted in the visualized thoracic   aorta.  PULMONARY ARTERIES: No pulmonary embolus is noted within the visualized   central pulmonary arteries.  PERICARDIUM/CARDIAC STRUCTURES:  normal     CORONARY:  -DOMINANCE: right  -LEFT MAIN CORONARY ARTERY: Patent   -ANTERIOR DESCENDING ARTERY:        -PROX: Heterogeneous, predominantly soft, plaque results in mild   narrowing.       -MID: Heterogeneous plaque results in mild narrowing, with positive   remodeling.       -DISTAL: Small vessel with scattered calcifications, grossly patent.  -CIRCUMFLEX ARTERY:        -PROX:  Patent  -MID:  Patent, terminates in an obtuse marginal branch  -RIGHT CORONARY ARTERY:        -PROX: Wall calcification without narrowing       -MID: Soft plaque results in moderate narrowing.       -DISTAL: Heterogeneous plaque results in mild narrowing      Myocardial Function:  The left ventricle is of normal size, wall thickness and function. Cine   display demonstrates no regional wall motion abnormality. LVEDV= 139 cc;   LVESV= 50 cc. Calculated ejection fraction is 64%.    CALCIUM SCORE  AgatstonEquivalent Score    Segment                                Score  --------------------------------------------------  Left Main                                0  Left anterior Descending          94  Circumflex                              3  Right                                   48  --------------------------------------------------  Total                                      146    Age/Sex Adjusted Percentile Rating (Raggi, Circulation 2000):   Greater   than 90th percentile, coronary age 60-64 years    IMPRESSION:     There is coronary artery disease involving the right and left anterior   descending coronary arteries.    Moderate narrowing is seen in the mid right coronary artery secondary to   a soft plaque with positive remodeling.    Heterogeneous plaque in the distal right coronary artery results in mild   narrowing    Heterogeneous, predominantly soft plaque in the proximal LAD results in   mild narrowing.    Heterogeneous plaque results in mild narrowing in the mid LAD with  positive remodeling.    Elevated calcium score.    Consultation with cardiologist recommended.    =========================  KEY:  Mild narrowin-49%  Moderate narrowin-69%  Severe stenosis: Greater than 70%                REED ASENCIO M.D., ATTENDING RADIOLOGIST  This document has been electronically signed. Oct 15 2019  1:12PM                  < end of copied text >

## 2020-08-20 NOTE — ED ADULT NURSE NOTE - CHIEF COMPLAINT QUOTE
Pt arrives to ED , breathing easy and unlabored c/o chest pain radiating  to the L arm started this morning upon awaking , denies cardiac HX, STAT EKG obtained

## 2020-08-20 NOTE — ED CDU PROVIDER INITIAL DAY NOTE - NO PERTINENT FAMILY HISTORY
"Chief Complaint   Patient presents with     Annual Eye Exam     Contact Lens Evaluation        Previous contact lens wearer? Yes: Acuvue 1 day Moist  Comfort of contact lenses :They are ok - \"contacts get really dry really fast\"  Satisfied with current lenses: Yes        Last Eye Exam: 2018  Dilated Previously: Yes    What are you currently using to see?  glasses and contacts    Distance Vision Acuity: Satisfied with vision - minimal if at all    Near Vision Acuity: Satisfied with vision while reading  with glasses/contacts    Eye Comfort: dry  Do you use eye drops? : Yes: sometimes - uses saline solution  Occupation or Hobbies: at home      Bouchra Ramey  [employer]       Medical, surgical and family histories reviewed and updated 6/26/2019.       OBJECTIVE: See Ophthalmology exam    ASSESSMENT:    ICD-10-CM    1. Examination of eyes and vision Z01.00 EYE EXAM (SIMPLE-NONBILLABLE)     REFRACTION   2. Myopia of both eyes H52.13 EYE EXAM (SIMPLE-NONBILLABLE)     REFRACTION   3. Regular astigmatism of both eyes H52.223 EYE EXAM (SIMPLE-NONBILLABLE)     REFRACTION      PLAN:     Patient Instructions   Eyeglass prescription given.    Dispensed trial contacts of Dailies Total One-  Return in 1 week for a contact lens check if you prefer that lens. Be sure to wear contact lenses in to that appointment.    Blink tears- 1 drop both eyes 2-4 x daily.    Return in 1 year for a complete eye exam or sooner if needed.    Kelvin Bal, OD          Patient called 6/27/2019- she has been wearing -6.00 both eyes- not -6.50 as in her records.  Will try Dailies Total 1 in -6.50 and will see if she likes that power better.  Will need to return for cl check if she would like prescription for Dailies Total 1.  Ok to dispense -6.00 in Dailies Total 1 if preferred.                " <<----- Click to add NO pertinent Family History

## 2020-08-20 NOTE — ED CDU PROVIDER INITIAL DAY NOTE - ATTENDING CONTRIBUTION TO CARE
I, Zia Zee, performed a face to face bedside interview with this patient regarding history of present illness, review of symptoms and relevant past medical, social and family history.  I completed an independent physical examination. I have communicated the patient’s plan of care and disposition with the ACP.      45yoM; with [facility] signif for HLD; now p/w, left sided chest pain, sscp, radiating to left arm, associated with nausea, diaphoresis, palpitations, lightheadedness, lasting 30min. reports similar episode in past. had cardiac ct in october showing mild-moderate LAD disease;  pe  awake alert heent ncat neck supple cor s1 s2 lung clear abd soft nontender neuro nonfocal   dx chest pain with work up including cards consult, serial troponins and NST

## 2020-08-20 NOTE — ED ADULT NURSE NOTE - NURSING MUSC STRENGTH
Skin/Abscess/FB HPI





- General


Chief complaint: Skin/Abscess/Foreign Body


Stated complaint: Abcess on arm, [facility] Pt


Time Seen by Provider: 09/10/19 20:23


Source: patient


Mode of arrival: ambulatory


Limitations: no limitations





- History of Present Illness


Initial comments: 





Patient is a 33-year-old female presenting to emergency Department with comp

laints of a possible infection on her right elbow times one week.  Patient 

states she is at [facility] and have the nurses evaluate her right elbow.  

Patient states approximately 3 weeks ago she fell onto her right elbow and had a

small abrasion there.  Approximately one week ago she started having pain, 

swelling, and erythema to the area.  Patient states they started her on Bactrim 

and she has been on that for 5 days.  Patient is afraid the infection is not 

getting any better.  Patient denies fever, chills, abdominal pain, nausea, 

vomiting.  Patient has no other complaints at this time.  Upon arrival to ER, 

vital signs are stable, afebrile.





- Related Data


                                  Previous Rx's











 Medication  Instructions  Recorded


 


Sulfamethox-Tmp 800-160Mg [Bactrim 1 each PO Q12HR 4 Days #8 tab 09/10/19





Ds]  











                                    Allergies











Allergy/AdvReac Type Severity Reaction Status Date / Time


 


No Known Allergies Allergy   Verified 09/10/19 20:19














Review of Systems


ROS Statement: 


Those systems with pertinent positive or pertinent negative responses have been 

documented in the HPI.





ROS Other: All systems not noted in ROS Statement are negative.





Past Medical History


Past Medical History: No Reported History


History of Any Multi-Drug Resistant Organisms: None Reported


Past Surgical History:  Section


Past Psychological History: No Psychological Hx Reported


Smoking Status: Current every day smoker


Past Alcohol Use History: Abuse, Daily


Past Drug Use History: None Reported





General Exam





- General Exam Comments


Initial Comments: 





GENERAL: 


Well-appearing, well-nourished and in no acute distress.





HEAD: 


Atraumatic, normocephalic.





EYES:


Pupils equal round and reactive to light, extraocular movements intact, sclera 

anicteric, conjunctiva are normal.





ENT: 


TMs normal, nares patent, oropharynx clear without exudates.  Moist mucous 

membranes.





NECK: 


Normal range of motion, supple without lymphadenopathy or JVD.





LUNGS:


 Breath sounds clear to auscultation bilaterally and equal.  No wheezes rales or

rhonchi.





HEART:


Regular rate and rhythm without murmurs, rubs or gallops.





ABDOMEN: 


Soft, nontender, normoactive bowel sounds.  No guarding, no rebound.  No masses 

appreciated.





: Deferred 





EXTREMITIES: 


Right elbow has full range of motion.  Olecranon bursa is erythematous, painful 

to the touch.  There is a small area of fluctuance and mild edema.  No clubbing 

or cyanosis.  There is no erythema outside of the bursa area.





NEUROLOGICAL: 


Cranial nerves II through XII grossly intact.  Normal speech, normal gait.





PSYCH:


Normal mood, normal affect.





SKIN:


 Warm, Dry, normal turgor, no rashes or lesions noted.


Limitations: no limitations





Course


                                   Vital Signs











  09/10/19





  20:15


 


Temperature 98.2 F


 


Pulse Rate 81


 


Respiratory 18





Rate 


 


Blood Pressure 104/68


 


O2 Sat by Pulse 97





Oximetry 














Medical Decision Making





- Medical Decision Making





Patient is a 33-year-old female presenting with olecranon bursitis of the right 

elbow.  Patient states she hit her elbow approximately 3 weeks ago and then one 

week ago started having pain, erythema, swelling of the right elbow.  Patient 

has been on Bactrim for approximately 5 days and is afraid it is not improving. 

X-rays reveal no bony changes, soft tissue swelling over the right olecranon.  

Patient will continue with Bactrim for a total of 14 days.  Patient is stable 

for discharge at this time.  Return parameters were discussed with the patient 

she verbalized understanding.  There is an Ace wrap applied to the right elbow 

with improvement in symptoms.  Patient is in agreement with this plan of care.  

Case discussed with Dr. Langston.





Disposition


Clinical Impression: 


 Olecranon bursitis, right elbow





Disposition: HOME SELF-CARE


Condition: Stable


Instructions (If sedation given, give patient instructions):  Elbow Bursitis 

(ED)


Additional Instructions: 


Please return to the Emergency Department if symptoms worsen or any other 

concerns.


Complete Bactrim for a total of 14 days.


Prescriptions: 


Sulfamethox-Tmp 800-160Mg [Bactrim Ds] 1 each PO Q12HR 4 Days #8 tab


Is patient prescribed a controlled substance at d/c from ED?: No


Referrals: 


None,Stated [Primary Care Provider] - 1-2 days
hand grasp, leg strength strong and equal bilaterally

## 2020-08-20 NOTE — ED ADULT TRIAGE NOTE - CHIEF COMPLAINT QUOTE
Pt arrives to ED , breathing easy and unlabored c/o chest pain with radiating  to the L arm started this morning upon awaking , denies cardiac HX, STAT EKG obtained Pt arrives to ED , breathing easy and unlabored c/o chest pain radiating  to the L arm started this morning upon awaking , denies cardiac HX, STAT EKG obtained

## 2020-08-20 NOTE — ED CDU PROVIDER INITIAL DAY NOTE - OBJECTIVE STATEMENT
See initial HPI below:   45yoM; with [facility] signif for HLD; now p/w chest pain--non-exertional, non-pleuritic, left sided chest pain, sscp, radiating to left arm, associated with nausea, diaphoresis, palpitations, lightheadedness, lasting 30min. reports similar episode in past. had cardiac ct in october showing mild-moderate LAD disease.  denies f/c/s. denies cough. denies abd pain. denies n/v.  PMH: HLD  SOCIAL: No tobacco/illicit substance use?EtOH

## 2020-08-20 NOTE — ED PROVIDER NOTE - NS ED ROS FT
Constitutional: (-) fever  (-)chills  (-)sweats  Eyes/ENT: (-) blurry vision, (-) epistaxis  (-)rhinorrhea   (-) sore throat    Cardiovascular: (+) chest pain, (+) palpitations (-) edema   Respiratory: (-) cough, (-) shortness of breath   Gastrointestinal: (+)nausea  (-)vomiting, (-) diarrhea  (-) abdominal pain   :  (-)dysuria, (-)frequency, (-)urgency, (-)hematuria  Musculoskeletal: (-) neck pain, (-) back pain, (-) joint pain  Integumentary: (-) rash, (-) edema  Neurological: (-) headache, (-) altered mental status  (-)LOC

## 2020-08-20 NOTE — CONSULT NOTE ADULT - ASSESSMENT
HPI: pt is a 46 y/o male with medical history of nonobstructive CAD (CTA 10/2019 mod CAD) , HLD presented with c/o CP started at 8: 30 am today, left sided, moderate intensity, felt like pressure, radiated to left arm, associated with rapid heart beats, lasted 15 minutes. Pt got scared and came to ER. The CP nonexertional. Denied dyspnea, orthopnea, pnd, edema nausea, vomiting hematuria melena syncope, near syncope.       Chest Pain  CAD  Trop #1 negative  EKG Nonischemic  trend Trop  Pt is already NPO (Last ate last night)  NST planned for today  Cont ASA      HLD  Cont Statin

## 2020-08-20 NOTE — CONSULT NOTE ADULT - ATTENDING COMMENTS
Pt is seen, examined, chart reviewed, d/w np/pa.  Management as outlined above.  Chest Pain  Moderate CAD  Trend trop  NST

## 2020-08-20 NOTE — ED ADULT NURSE REASSESSMENT NOTE - NS ED NURSE REASSESS COMMENT FT1
Cardiology NP at bedside. As per NP, d/c NPO order. Patient can eat. Resting comfortably. No distress noted. C/o Mild chest pain, reports pain is same as when he came in. Cardiology NP at bedside. As per NP, d/c NPO order. Patient can eat. Resting comfortably. No distress noted. C/o Mild chest pain, reports pain is same as when he came in. Troponin sent. Meal tray ordered.

## 2020-08-20 NOTE — ED CDU PROVIDER DISPOSITION NOTE - PATIENT PORTAL LINK FT
You can access the FollowMyHealth Patient Portal offered by [facility] by registering at the following website: [email or web address]. By joining [email or web address]’s FollowMyHealth portal, you will also be able to view your health information using other applications (apps) compatible with our system.

## 2020-12-01 ENCOUNTER — INPATIENT (INPATIENT)
Facility: HOSPITAL | Age: 45
LOS: 1 days | Discharge: HOME | DRG: 62 | End: 2020-12-03
Attending: INTERNAL MEDICINE | Admitting: INTERNAL MEDICINE
Payer: MEDICAID

## 2020-12-01 VITALS — HEIGHT: 67 IN

## 2020-12-01 DIAGNOSIS — I63.9 CEREBRAL INFARCTION, UNSPECIFIED: ICD-10-CM

## 2020-12-01 LAB
ALBUMIN SERPL ELPH-MCNC: 4.7 G/DL — SIGNIFICANT CHANGE UP (ref 3.3–5.2)
ALP SERPL-CCNC: 67 U/L — SIGNIFICANT CHANGE UP (ref 40–120)
ALT FLD-CCNC: 18 U/L — SIGNIFICANT CHANGE UP
ANION GAP SERPL CALC-SCNC: 10 MMOL/L — SIGNIFICANT CHANGE UP (ref 5–17)
APTT BLD: 32.2 SEC — SIGNIFICANT CHANGE UP (ref 27.5–35.5)
AST SERPL-CCNC: 23 U/L — SIGNIFICANT CHANGE UP
BASOPHILS # BLD AUTO: 0.02 K/UL — SIGNIFICANT CHANGE UP (ref 0–0.2)
BASOPHILS NFR BLD AUTO: 0.2 % — SIGNIFICANT CHANGE UP (ref 0–2)
BILIRUB SERPL-MCNC: 0.5 MG/DL — SIGNIFICANT CHANGE UP (ref 0.4–2)
BUN SERPL-MCNC: 17 MG/DL — SIGNIFICANT CHANGE UP (ref 8–20)
CALCIUM SERPL-MCNC: 9.3 MG/DL — SIGNIFICANT CHANGE UP (ref 8.6–10.2)
CHLORIDE SERPL-SCNC: 102 MMOL/L — SIGNIFICANT CHANGE UP (ref 98–107)
CHOLEST SERPL-MCNC: 130 MG/DL — SIGNIFICANT CHANGE UP
CO2 SERPL-SCNC: 24 MMOL/L — SIGNIFICANT CHANGE UP (ref 22–29)
CREAT SERPL-MCNC: 1.1 MG/DL — SIGNIFICANT CHANGE UP (ref 0.5–1.3)
EOSINOPHIL # BLD AUTO: 0.16 K/UL — SIGNIFICANT CHANGE UP (ref 0–0.5)
EOSINOPHIL NFR BLD AUTO: 1.5 % — SIGNIFICANT CHANGE UP (ref 0–6)
GLUCOSE SERPL-MCNC: 153 MG/DL — HIGH (ref 70–99)
HCT VFR BLD CALC: 42.8 % — SIGNIFICANT CHANGE UP (ref 39–50)
HDLC SERPL-MCNC: 40 MG/DL — LOW
HGB BLD-MCNC: 14.6 G/DL — SIGNIFICANT CHANGE UP (ref 13–17)
IMM GRANULOCYTES NFR BLD AUTO: 0.2 % — SIGNIFICANT CHANGE UP (ref 0–1.5)
INR BLD: 1.18 RATIO — HIGH (ref 0.88–1.16)
LIPID PNL WITH DIRECT LDL SERPL: 70 MG/DL — SIGNIFICANT CHANGE UP
LYMPHOCYTES # BLD AUTO: 19.4 % — SIGNIFICANT CHANGE UP (ref 13–44)
LYMPHOCYTES # BLD AUTO: 2.02 K/UL — SIGNIFICANT CHANGE UP (ref 1–3.3)
MCHC RBC-ENTMCNC: 28.5 PG — SIGNIFICANT CHANGE UP (ref 27–34)
MCHC RBC-ENTMCNC: 34.1 GM/DL — SIGNIFICANT CHANGE UP (ref 32–36)
MCV RBC AUTO: 83.4 FL — SIGNIFICANT CHANGE UP (ref 80–100)
MONOCYTES # BLD AUTO: 0.45 K/UL — SIGNIFICANT CHANGE UP (ref 0–0.9)
MONOCYTES NFR BLD AUTO: 4.3 % — SIGNIFICANT CHANGE UP (ref 2–14)
NEUTROPHILS # BLD AUTO: 7.75 K/UL — HIGH (ref 1.8–7.4)
NEUTROPHILS NFR BLD AUTO: 74.4 % — SIGNIFICANT CHANGE UP (ref 43–77)
NON HDL CHOLESTEROL: 90 MG/DL — SIGNIFICANT CHANGE UP
PLATELET # BLD AUTO: 246 K/UL — SIGNIFICANT CHANGE UP (ref 150–400)
POTASSIUM SERPL-MCNC: 3.7 MMOL/L — SIGNIFICANT CHANGE UP (ref 3.5–5.3)
POTASSIUM SERPL-SCNC: 3.7 MMOL/L — SIGNIFICANT CHANGE UP (ref 3.5–5.3)
PROT SERPL-MCNC: 8.4 G/DL — SIGNIFICANT CHANGE UP (ref 6.6–8.7)
PROTHROM AB SERPL-ACNC: 13.6 SEC — SIGNIFICANT CHANGE UP (ref 10.6–13.6)
RBC # BLD: 5.13 M/UL — SIGNIFICANT CHANGE UP (ref 4.2–5.8)
RBC # FLD: 12.5 % — SIGNIFICANT CHANGE UP (ref 10.3–14.5)
SARS-COV-2 RNA SPEC QL NAA+PROBE: SIGNIFICANT CHANGE UP
SODIUM SERPL-SCNC: 136 MMOL/L — SIGNIFICANT CHANGE UP (ref 135–145)
TRIGL SERPL-MCNC: 98 MG/DL — SIGNIFICANT CHANGE UP
TROPONIN T SERPL-MCNC: <0.01 NG/ML — SIGNIFICANT CHANGE UP (ref 0–0.06)
WBC # BLD: 10.42 K/UL — SIGNIFICANT CHANGE UP (ref 3.8–10.5)
WBC # FLD AUTO: 10.42 K/UL — SIGNIFICANT CHANGE UP (ref 3.8–10.5)

## 2020-12-01 PROCEDURE — ZZZZZ: CPT

## 2020-12-01 PROCEDURE — 70498 CT ANGIOGRAPHY NECK: CPT | Mod: 26

## 2020-12-01 PROCEDURE — 0042T: CPT

## 2020-12-01 PROCEDURE — 71045 X-RAY EXAM CHEST 1 VIEW: CPT | Mod: 26

## 2020-12-01 PROCEDURE — 70496 CT ANGIOGRAPHY HEAD: CPT | Mod: 26

## 2020-12-01 PROCEDURE — 70450 CT HEAD/BRAIN W/O DYE: CPT | Mod: 26,59

## 2020-12-01 PROCEDURE — 99291 CRITICAL CARE FIRST HOUR: CPT

## 2020-12-01 RX ORDER — ALTEPLASE 100 MG
71.8 KIT INTRAVENOUS ONCE
Refills: 0 | Status: COMPLETED | OUTPATIENT
Start: 2020-12-01 | End: 2020-12-01

## 2020-12-01 RX ORDER — ALTEPLASE 100 MG
8 KIT INTRAVENOUS ONCE
Refills: 0 | Status: COMPLETED | OUTPATIENT
Start: 2020-12-01 | End: 2020-12-01

## 2020-12-01 RX ORDER — DIPHENHYDRAMINE HCL 50 MG
25 CAPSULE ORAL ONCE
Refills: 0 | Status: COMPLETED | OUTPATIENT
Start: 2020-12-01 | End: 2020-12-01

## 2020-12-01 RX ADMIN — ALTEPLASE 480 MILLIGRAM(S): KIT at 22:45

## 2020-12-01 RX ADMIN — Medication 25 MILLIGRAM(S): at 23:52

## 2020-12-01 RX ADMIN — ALTEPLASE 71.8 MILLIGRAM(S): KIT at 23:46

## 2020-12-01 RX ADMIN — ALTEPLASE 71.8 MILLIGRAM(S): KIT at 22:46

## 2020-12-01 RX ADMIN — ALTEPLASE 8 MILLIGRAM(S): KIT at 23:46

## 2020-12-01 RX ADMIN — Medication 125 MILLIGRAM(S): at 23:52

## 2020-12-01 NOTE — ED PROVIDER NOTE - MUSCULOSKELETAL, MLM
Spine appears normal, range of motion is not limited, no muscle or joint tenderness Spine appears normal, no tenderness

## 2020-12-01 NOTE — ED PROVIDER NOTE - PROGRESS NOTE DETAILS
spoke with Dr. Buitrago, recommend TPA. Spoke with neuro ICU PA Pt agreeable to TPA spoke with Dr. Tsang, recommend TPA. Called to bedside to evaul pt due to acute rash. Pt found to have diffuse hives, airway intact no wheezing. Vitals stable. Will order medication to address sx. Awaiting neuro critical care evaul. Called to bedside to evaluate pt due to acute itching rash and tightness in the chest. Pt found to have diffuse hives, airway intact no wheezing. Vitals stable. Will order medication to address sx. Awaiting neuro critical care evaluate. Patient with persistent rash but improved from onset with less itching. Repeat ekg unchanged.

## 2020-12-01 NOTE — ED PROVIDER NOTE - OBJECTIVE STATEMENT
44y/o M with PMHx of HTN and hyperlipidemia presents to the ED c/o left sided numbness which began suddenly. Pt states that he was driving 2 hrs ago; at 2040 when he felt sudden onset of left sided diffuse numbness including facial, UE and LE with assoc. symptoms of difficulty moving arms, states they feel heavy. He has additional c/o nausea and blurry vision. Pt states that numbness has persisted and is still present during evaul.   Dr. Orourke called to evaul patient with acute symptoms at 2217. 44y/o M with PMHx of HTN and hyperlipidemia presents to the ED c/o left sided numbness which began suddenly. Pt states that he was driving 2 hrs ago; at 2040 when he felt sudden onset of left sided diffuse numbness including facial, UE and LE with assoc. symptoms of difficulty moving arms, states they feel heavy. He has additional c/o nausea and blurry vision. Pt states that numbness has persisted and is still present during evaul. No use of blood thinners, takes ASA. No pertinent FHx CVA/TIA symptoms.  Dr. Orourke called to evaul patient with acute symptoms at 2217. 44y/o M with PMHx of HTN and hyperlipidemia presents to the ED c/o left sided numbness which began suddenly. Pt states that he was driving 2 hrs ago; at 2040 when he felt sudden onset of left sided diffuse numbness including facial, UE and LE with assoc. symptoms of difficulty moving arms, states they feel heavy. He has additional c/o nausea for past few days. Pt states that numbness has persisted and is still present during evaul. No use of blood thinners, takes ASA. No pertinent FHx CVA/TIA symptoms.  Dr. Orourke called to evaul patient with acute symptoms at 2217.
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
## 2020-12-01 NOTE — ED PROVIDER NOTE - CARE PLAN
Principal Discharge DX:	Cerebrovascular accident (CVA), unspecified mechanism   Principal Discharge DX:	Cerebrovascular accident (CVA), unspecified mechanism  Secondary Diagnosis:	Allergic reaction to drug, initial encounter

## 2020-12-01 NOTE — ED ADULT NURSE REASSESSMENT NOTE - NS ED NURSE REASSESS COMMENT FT1
tpa complete. pt now c/o itching to b/l arms. hives noted to trunk b/l arms. pt denies nausea, vomiting,. chest tightness, sob, no other complaints. NSR on cm. speech clear. no lip or facial swelling noted. pt aao x4. respirations even and unlabored. breath sounds clear. Dr Orourke made aware, pt medicated as ordered, will continue to monitor.

## 2020-12-01 NOTE — ED ADULT TRIAGE NOTE - CHIEF COMPLAINT QUOTE
complains of left side numbness started 2 hours ago code stroke called upon arrival moved to ED CT scan MD carmen notified with code team

## 2020-12-02 ENCOUNTER — TRANSCRIPTION ENCOUNTER (OUTPATIENT)
Age: 45
End: 2020-12-02

## 2020-12-02 LAB
ANION GAP SERPL CALC-SCNC: 13 MMOL/L — SIGNIFICANT CHANGE UP (ref 5–17)
BLD GP AB SCN SERPL QL: SIGNIFICANT CHANGE UP
BUN SERPL-MCNC: 16 MG/DL — SIGNIFICANT CHANGE UP (ref 8–20)
CALCIUM SERPL-MCNC: 8.9 MG/DL — SIGNIFICANT CHANGE UP (ref 8.6–10.2)
CHLORIDE SERPL-SCNC: 105 MMOL/L — SIGNIFICANT CHANGE UP (ref 98–107)
CO2 SERPL-SCNC: 20 MMOL/L — LOW (ref 22–29)
CREAT SERPL-MCNC: 0.91 MG/DL — SIGNIFICANT CHANGE UP (ref 0.5–1.3)
GLUCOSE BLDC GLUCOMTR-MCNC: 145 MG/DL — HIGH (ref 70–99)
GLUCOSE BLDC GLUCOMTR-MCNC: 159 MG/DL — HIGH (ref 70–99)
GLUCOSE SERPL-MCNC: 172 MG/DL — HIGH (ref 70–99)
HCT VFR BLD CALC: 41.6 % — SIGNIFICANT CHANGE UP (ref 39–50)
HGB BLD-MCNC: 14.2 G/DL — SIGNIFICANT CHANGE UP (ref 13–17)
MAGNESIUM SERPL-MCNC: 2 MG/DL — SIGNIFICANT CHANGE UP (ref 1.6–2.6)
MCHC RBC-ENTMCNC: 28.3 PG — SIGNIFICANT CHANGE UP (ref 27–34)
MCHC RBC-ENTMCNC: 34.1 GM/DL — SIGNIFICANT CHANGE UP (ref 32–36)
MCV RBC AUTO: 82.9 FL — SIGNIFICANT CHANGE UP (ref 80–100)
PHOSPHATE SERPL-MCNC: 3.2 MG/DL — SIGNIFICANT CHANGE UP (ref 2.4–4.7)
PLATELET # BLD AUTO: 237 K/UL — SIGNIFICANT CHANGE UP (ref 150–400)
POTASSIUM SERPL-MCNC: 4.2 MMOL/L — SIGNIFICANT CHANGE UP (ref 3.5–5.3)
POTASSIUM SERPL-SCNC: 4.2 MMOL/L — SIGNIFICANT CHANGE UP (ref 3.5–5.3)
RBC # BLD: 5.02 M/UL — SIGNIFICANT CHANGE UP (ref 4.2–5.8)
RBC # FLD: 12.6 % — SIGNIFICANT CHANGE UP (ref 10.3–14.5)
SODIUM SERPL-SCNC: 138 MMOL/L — SIGNIFICANT CHANGE UP (ref 135–145)
WBC # BLD: 7.22 K/UL — SIGNIFICANT CHANGE UP (ref 3.8–10.5)
WBC # FLD AUTO: 7.22 K/UL — SIGNIFICANT CHANGE UP (ref 3.8–10.5)

## 2020-12-02 PROCEDURE — 93306 TTE W/DOPPLER COMPLETE: CPT | Mod: 26

## 2020-12-02 PROCEDURE — 70450 CT HEAD/BRAIN W/O DYE: CPT | Mod: 26

## 2020-12-02 PROCEDURE — 93010 ELECTROCARDIOGRAM REPORT: CPT

## 2020-12-02 PROCEDURE — 99223 1ST HOSP IP/OBS HIGH 75: CPT

## 2020-12-02 PROCEDURE — 70551 MRI BRAIN STEM W/O DYE: CPT | Mod: 26

## 2020-12-02 RX ORDER — FAMOTIDINE 10 MG/ML
20 INJECTION INTRAVENOUS ONCE
Refills: 0 | Status: COMPLETED | OUTPATIENT
Start: 2020-12-02 | End: 2020-12-02

## 2020-12-02 RX ORDER — DEXTROSE 50 % IN WATER 50 %
25 SYRINGE (ML) INTRAVENOUS ONCE
Refills: 0 | Status: DISCONTINUED | OUTPATIENT
Start: 2020-12-02 | End: 2020-12-03

## 2020-12-02 RX ORDER — SODIUM CHLORIDE 9 MG/ML
1000 INJECTION, SOLUTION INTRAVENOUS
Refills: 0 | Status: DISCONTINUED | OUTPATIENT
Start: 2020-12-02 | End: 2020-12-02

## 2020-12-02 RX ORDER — GLUCAGON INJECTION, SOLUTION 0.5 MG/.1ML
1 INJECTION, SOLUTION SUBCUTANEOUS ONCE
Refills: 0 | Status: DISCONTINUED | OUTPATIENT
Start: 2020-12-02 | End: 2020-12-03

## 2020-12-02 RX ORDER — ATORVASTATIN CALCIUM 80 MG/1
40 TABLET, FILM COATED ORAL AT BEDTIME
Refills: 0 | Status: DISCONTINUED | OUTPATIENT
Start: 2020-12-02 | End: 2020-12-03

## 2020-12-02 RX ORDER — SODIUM CHLORIDE 9 MG/ML
1000 INJECTION INTRAMUSCULAR; INTRAVENOUS; SUBCUTANEOUS
Refills: 0 | Status: DISCONTINUED | OUTPATIENT
Start: 2020-12-02 | End: 2020-12-02

## 2020-12-02 RX ORDER — SODIUM CHLORIDE 9 MG/ML
1000 INJECTION INTRAMUSCULAR; INTRAVENOUS; SUBCUTANEOUS ONCE
Refills: 0 | Status: COMPLETED | OUTPATIENT
Start: 2020-12-02 | End: 2020-12-02

## 2020-12-02 RX ORDER — FLUTICASONE PROPIONATE 50 MCG
2 SPRAY, SUSPENSION NASAL
Qty: 0 | Refills: 0 | DISCHARGE

## 2020-12-02 RX ORDER — SODIUM CHLORIDE 9 MG/ML
500 INJECTION, SOLUTION INTRAVENOUS ONCE
Refills: 0 | Status: COMPLETED | OUTPATIENT
Start: 2020-12-02 | End: 2020-12-02

## 2020-12-02 RX ORDER — INSULIN LISPRO 100/ML
VIAL (ML) SUBCUTANEOUS
Refills: 0 | Status: DISCONTINUED | OUTPATIENT
Start: 2020-12-02 | End: 2020-12-03

## 2020-12-02 RX ORDER — DEXTROSE 50 % IN WATER 50 %
15 SYRINGE (ML) INTRAVENOUS ONCE
Refills: 0 | Status: DISCONTINUED | OUTPATIENT
Start: 2020-12-02 | End: 2020-12-03

## 2020-12-02 RX ORDER — CETIRIZINE HYDROCHLORIDE 10 MG/1
1 TABLET ORAL
Qty: 0 | Refills: 0 | DISCHARGE

## 2020-12-02 RX ORDER — ACETAMINOPHEN 500 MG
650 TABLET ORAL EVERY 6 HOURS
Refills: 0 | Status: DISCONTINUED | OUTPATIENT
Start: 2020-12-02 | End: 2020-12-03

## 2020-12-02 RX ORDER — INFLUENZA VIRUS VACCINE 15; 15; 15; 15 UG/.5ML; UG/.5ML; UG/.5ML; UG/.5ML
0.5 SUSPENSION INTRAMUSCULAR ONCE
Refills: 0 | Status: DISCONTINUED | OUTPATIENT
Start: 2020-12-02 | End: 2020-12-03

## 2020-12-02 RX ORDER — DIPHENHYDRAMINE HCL 50 MG
25 CAPSULE ORAL ONCE
Refills: 0 | Status: COMPLETED | OUTPATIENT
Start: 2020-12-02 | End: 2020-12-02

## 2020-12-02 RX ADMIN — FAMOTIDINE 20 MILLIGRAM(S): 10 INJECTION INTRAVENOUS at 00:29

## 2020-12-02 RX ADMIN — SODIUM CHLORIDE 50 MILLILITER(S): 9 INJECTION INTRAMUSCULAR; INTRAVENOUS; SUBCUTANEOUS at 03:55

## 2020-12-02 RX ADMIN — Medication 25 MILLIGRAM(S): at 00:18

## 2020-12-02 RX ADMIN — ATORVASTATIN CALCIUM 40 MILLIGRAM(S): 80 TABLET, FILM COATED ORAL at 22:23

## 2020-12-02 RX ADMIN — Medication 2: at 11:23

## 2020-12-02 RX ADMIN — SODIUM CHLORIDE 500 MILLILITER(S): 9 INJECTION, SOLUTION INTRAVENOUS at 01:08

## 2020-12-02 RX ADMIN — SODIUM CHLORIDE 1000 MILLILITER(S): 9 INJECTION INTRAMUSCULAR; INTRAVENOUS; SUBCUTANEOUS at 02:29

## 2020-12-02 NOTE — ED ADULT NURSE REASSESSMENT NOTE - NS ED NURSE REASSESS COMMENT FT1
pt now c/o chest tightness and nausea. dry heaving noted. Dr Orourke called to bedside. repeat ekg obtained. NSR. pt still aao x4. neuro check unchanged and documented. pt medicated as ordered, will continue to monitor.

## 2020-12-02 NOTE — PHARMACOTHERAPY INTERVENTION NOTE - COMMENTS
Called patient's pharmacy regarding medication reconciliation. Please refer to outpatient medication review for the updated list. Called patient's pharmacy and spoke with patient at bedside regarding medication reconciliation. Please refer to outpatient medication review for the updated list.

## 2020-12-02 NOTE — CONSULT NOTE ADULT - ASSESSMENT
The patient is a 45y Male who is followed by neurology because of numbness    Stroke/TIA  s/p alteplase, symptoms resolved    NSICU admission for post alteplase vitals and neurochecks per protocol  no antiplatelets or anticoagulant for 24 hours after alteplase administration  repeat head CT  24 hours after alteplase administration  MRI brain as above  PT/OT/Speech therapy appreciated  keep BP under 180/105  lipid panel LDL=70  continue lipitor  DVT ppx    will need echo and likely MARY given old CVA and possible new TIA or stroke aborted by alteplase    will follow with you    Daryl Duong MD PhD   [id number]

## 2020-12-02 NOTE — PATIENT PROFILE ADULT - HOME ACCESSIBILITY CONCERNS
There are no preventive care reminders to display for this patient.    Patient is up to date, no discussion needed.           none

## 2020-12-02 NOTE — OCCUPATIONAL THERAPY INITIAL EVALUATION ADULT - ADDITIONAL COMMENTS
Pt lives in first floor, single level apartment with no ALESIA and no steps inside. Bathroom has shower stall with curtains. Pt does not own any DME. Pt is right handed. Pt drives. Pt's wife is able and available to assist upon discharge as needed.

## 2020-12-02 NOTE — H&P ADULT - ATTENDING COMMENTS
Pt is a 44 yo M with h/o HTN and HLD presented to the ER c/o left sided numbness which began suddenly. Pt felt sudden onset of left sided diffuse numbness including facial, UE and LE with associated symptoms of difficulty moving arms, feeling heavy. Pt sent for CT head: No CT evidence of acute intracranial hemorrhage, mass effect or acute territorial infarction. Chronic left cerebellar infarct. Pt given TPA and admitted to the ICU. ICU admitting dx: R stroke, s/p TPA. Today pt is AAO x3 and neurologically intact    CVS: No antiHTN meds  Heme: No NSAIDS or Heparin 24 hrs post TPA  FEN: IVF with NS/ Post MRI dysphagia evaluation for po diet  Neuro: MRI today/ Neurochecks q 1 hr/ When taking po restart Statin (and Asa at 24 hrs)/Neurology evaluation  PT/OT  Social: May transfer to [facility] tomorrow

## 2020-12-02 NOTE — ED ADULT NURSE REASSESSMENT NOTE - NS ED NURSE REASSESS COMMENT FT1
pt now resting comfortably. denies cp, sob, dizziness, nausea, vomiting. vss. aao x4. respirations even and unlabored. report given to ICU RN. pending transport.

## 2020-12-02 NOTE — OCCUPATIONAL THERAPY INITIAL EVALUATION ADULT - SPECIAL TRAINING, OT EVAL
Functional mobility independently with no assistive device with overall good balance, proper foot placement/body positioning and safe negotiation of environmental obstacles. Pt without any losses of balance and with overall good safety awareness.
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
## 2020-12-02 NOTE — PHYSICAL THERAPY INITIAL EVALUATION ADULT - ADDITIONAL COMMENTS
Pt reports living in a private apartment with his wife & kids. There are no ALESIA and no steps inside.  Pt does not own any DME. Pt is right handed. Pt drives. Pt's wife is able and available to assist upon discharge as needed.
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
## 2020-12-02 NOTE — H&P ADULT - NSHPLABSRESULTS_GEN_ALL_CORE
Labs:                        14.6   10.42 )-----------( 246      ( 01 Dec 2020 22:26 )             42.8     12-01    136  |  102  |  17.0  ----------------------------<  153<H>  3.7   |  24.0  |  1.10    Ca    9.3      01 Dec 2020 22:26    TPro  8.4  /  Alb  4.7  /  TBili  0.5  /  DBili  x   /  AST  23  /  ALT  18  /  AlkPhos  67  12-01 12-01 Chol 130 LDL -- HDL 40<L> Trig 98  PT/INR - ( 01 Dec 2020 22:26 )   PT: 13.6 sec;   INR: 1.18 ratio    PTT - ( 01 Dec 2020 22:26 )  PTT:32.2 sec    · EKG Date/Time: 02-Dec-2020 08:53  · Rate: 95   · Interpretation: normal sinus rhythm, Normal axis, Normal KS interval and QRS complex. There are no acute ischemic ST or T-wave changes.       Radiology report:  - Xray Chest: CTA  - CTH: neg for ICH or LVO. No evidence of acute CVA  - Echo: pending am study  - MR/A Brain and neck: Am study Labs:                        14.6   10.42 )-----------( 246      ( 01 Dec 2020 22:26 )             42.8     12-01    136  |  102  |  17.0  ----------------------------<  153<H>  3.7   |  24.0  |  1.10    Ca    9.3      01 Dec 2020 22:26    TPro  8.4  /  Alb  4.7  /  TBili  0.5  /  DBili  x   /  AST  23  /  ALT  18  /  AlkPhos  67  12-01 12-01 Chol 130 LDL -- HDL 40<L> Trig 98  PT/INR - ( 01 Dec 2020 22:26 )   PT: 13.6 sec;   INR: 1.18 ratio    PTT - ( 01 Dec 2020 22:26 )  PTT:32.2 sec    · EKG Date/Time: 02-Dec-2020 08:53  · Rate: 95   · Interpretation: normal sinus rhythm, Normal axis, Normal [place] interval and QRS complex. There are no acute ischemic ST or T-wave changes.       Radiology report:  - Xray Chest: CT clear  - CTH: neg for ICH or LVO. No evidence of acute CVA  - Echo: pending am study  - MR/A Brain and neck: Am study

## 2020-12-02 NOTE — H&P ADULT - NSHPPHYSICALEXAM_GEN_ALL_CORE
Vital Signs Last 24 Hrs  T(C): 36.4 (02 Dec 2020 00:15), Max: 37.1 (01 Dec 2020 22:37)  T(F): 97.6 (02 Dec 2020 00:15), Max: 98.7 (01 Dec 2020 22:37)  HR: 88 (02 Dec 2020 00:15) (72 - 88)  BP: 107/70 (02 Dec 2020 00:15) (107/70 - 151/77)  BP(mean): --  RR: 18 (02 Dec 2020 00:15) (18 - 18)  SpO2: 96% (02 Dec 2020 00:15) (96% - 99%)    Physical Exam:  Constitutional: Awake / alert  HEENT: PERRLA, EOMI  Neck: Supple  Respiratory: Breath sounds are clear bilaterally  Cardiovascular: S1 and S2, regular rhythm  Gastrointestinal: Soft, NT/ND  Extremities:  no edema  Vascular: No carotid Bruit  Musculoskeletal: no joint swelling/tenderness, no abnormal movements  Skin: No rashes    Neurological Exam:  HF: A x O x 3, appropriately interactive, normal affect, speech fluent, no aphasia or paraphasic errors. Naming /repetition intact   CN: PERRL, EOMI, VFF, facial sensation normal, no NLFD, tongue midline  Motor: No pronator drift, Strength 5/5 in all 4 ext, except for LLE 4+/5 power compared to right.  normal bulk and tone, no tremor, rigidity or bradykinesia  Sens: Intact to light touch, diminshed on the left side.  Reflexes: Symmetric and normal, downgoing toes b/l  Coord:  No FNFA, dysmetria, MAGALY intact   Gait/Balance: Cannot test Vital Signs Last 24 Hrs  T(C): 36.4 (02 Dec 2020 00:15), Max: 37.1 (01 Dec 2020 22:37)  T(F): 97.6 (02 Dec 2020 00:15), Max: 98.7 (01 Dec 2020 22:37)  HR: 88 (02 Dec 2020 00:15) (72 - 88)  BP: 107/70 (02 Dec 2020 00:15) (107/70 - 151/77)  BP(mean): --  RR: 18 (02 Dec 2020 00:15) (18 - 18)  SpO2: 96% (02 Dec 2020 00:15) (96% - 99%)    Physical Exam:  Constitutional: Awake / alert  HEENT: PERRLA, EOMI  Neck: Supple  Respiratory: Breath sounds are clear bilaterally  Cardiovascular: S1 and S2, regular rhythm  Gastrointestinal: Soft, NT/ND  Extremities:  no edema  Vascular: No carotid Bruit  Musculoskeletal: no joint swelling/tenderness, no abnormal movements  Skin: No rashes    Neurological Exam:  HF: A x O x 3, appropriately interactive, normal affect, speech fluent, no aphasia or paraphasic errors. Naming /repetition intact   CN: PERRL, EOMI, VFF, facial sensation normal, no NLFD, tongue midline  Motor: No pronator drift, LUE / LLE 4+/5 power compared to right.  normal bulk and tone, no tremor, rigidity or bradykinesia  Sens: Intact to light touch, diminshed on the left side UE/LE.  Proprioception challenges cannot tell up toe or down toe.  Reflexes: Symmetric and normal, downgoing toes b/l  Coord:  No FNFA, dysmetria, MAGALY intact   Gait/Balance: Cannot test

## 2020-12-02 NOTE — H&P ADULT - ASSESSMENT
A/P:     46y/o M with PMHx of HTN and hyperlipidemia presents to the ED c/o left sided numbness and Left LE weakness. NIHSS 2.  s/p TPA and admit to Neuro ICU    Problem List:  - Acute CVA  - HTN  - HLD    Plan:  (Care plan below d/w Attending Intensivist)  NEURO:  NIHSS 2 and GCS [id number]  - Neuro checks and vitals q1 hr  - Antiseizure prophylaxis  - HOB 30 degrees  - Immediate CTH if any change in neuro status  - MRI/A brain-carotids  - CTH at 9pm on 12/2 post TPA protocol.  - ASA dosing initiated  - Lipitor 80mg vs 40mg depending on LDL profile in AM    PULMONARY:  - RA  - CARLY  - Pt encouraged to cough / take deep breaths, use incentive spirometry    CARDIAC:   - Goal SBP,  Keep sbp < 180 s/p TPA some permissive HTN allowed  - Echo TTE with bubble study:   - Outpt and Inpatient Meds to be continued    RENAL:   - I/O: Strict, keep Euvolemic  - Aim for Na+ goals above 140  - Electrolyte supplement as needed  - IVF's NS at 50cc/hr    GI/:  - Diet will eval in AM for official dysphagia screen  - For now will aim for NPO x meds  - Passed bedside swallow without issue  - Bowel Regiment with senna / miralax    HEME:  - H/H :   14/42  - Platelets  246  - continue to monitor will transfuse if H/H falls below 7 or there is evidence of active bleeding    ID:  - WBC  10.42  - Tmax  98  - Urinalysis in ED midstream  - Cultures only if spikes fever  - Covid negative    ENDOCRINE:  - CARLY  - BG goals 140-180  - Electrolyte supplement as needed    MUSKULOSKELETAL:  - CARLY    DVT/GI PPX:  - SCD'S bilaterally  - Serial Duplex  - Hold Lovenox / Heparin SQ dosing at this time until 24 hour post TPA CTH  - GI prophylaxis  - Caprini evaluated daily overall risk greater than 5.  Will determine daily chemical DVT prophylaxis goals    PSYCH:  - CARLY    LINES:  - PIV  - voiding    CONSULTATIONS:  Neurology  medicine    Total Critical Care Time spent > 62 MINUTES IN EVALUATING PATIENT, MEDICAL RECORD, IMAGING STUDIES AND IMPLEMENTING CARE PLAN INITIATIVES TO PRESERVE OPTIMAL BRAIN FUNCTION WHILE LIMITING FURTHER NEUROLOGICAL INJURY AND OR NEURO-AXIS COLLAPSE A/P:     46y/o M with PMHx of HTN and hyperlipidemia presents to the ED c/o left sided numbness and Left LE weakness. NIHSS 4.  s/p TPA and admit to Neuro ICU    Problem List:  - Acute CVA  - HTN  - HLD    Plan:  (Care plan below d/w Attending Intensivist)  NEURO:  NIHSS 4 and GCS [id number]  - Neuro checks and vitals q1 hr  - Antiseizure prophylaxis  - HOB 30 degrees  - Immediate CTH if any change in neuro status  - MRI/A brain-carotids  - CTH at 9pm on 12/2 post TPA protocol.  - ASA dosing initiated  - Lipitor 80mg vs 40mg depending on LDL profile in AM    PULMONARY:  - RA  - CARLY  - Pt encouraged to cough / take deep breaths, use incentive spirometry    CARDIAC:   - Goal SBP,  Keep sbp < 180 s/p TPA some permissive HTN allowed  - Echo TTE with bubble study:   - Outpt and Inpatient Meds to be continued    RENAL:   - I/O: Strict, keep Euvolemic  - Aim for Na+ goals above 140  - Electrolyte supplement as needed  - IVF's NS at 50cc/hr    GI/:  - Diet will eval in AM for official dysphagia screen  - For now will aim for NPO x meds  - Passed bedside swallow without issue  - Bowel Regiment with senna / miralax    HEME:  - H/H :   14/42  - Platelets  246  - continue to monitor will transfuse if H/H falls below 7 or there is evidence of active bleeding    ID:  - WBC  10.42  - Tmax  98  - Urinalysis in ED midstream  - Cultures only if spikes fever  - Covid negative    ENDOCRINE:  - CARLY  - BG goals 140-180  - Electrolyte supplement as needed    MUSKULOSKELETAL:  - CARLY    DVT/GI PPX:  - SCD'S bilaterally  - Serial Duplex  - Hold Lovenox / Heparin SQ dosing at this time until 24 hour post TPA CTH  - GI prophylaxis  - Caprini evaluated daily overall risk greater than 5.  Will determine daily chemical DVT prophylaxis goals    PSYCH:  - CARLY    LINES:  - PIV  - voiding    CONSULTATIONS:  Neurology  medicine    Total Critical Care Time spent > 62 MINUTES IN EVALUATING PATIENT, MEDICAL RECORD, IMAGING STUDIES AND IMPLEMENTING CARE PLAN INITIATIVES TO PRESERVE OPTIMAL BRAIN FUNCTION WHILE LIMITING FURTHER NEUROLOGICAL INJURY AND OR NEURO-AXIS COLLAPSE
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
## 2020-12-02 NOTE — PHYSICAL THERAPY INITIAL EVALUATION ADULT - GENERAL OBSERVATIONS, REHAB EVAL
Pt received in bed, + IV, +Tele, breathing on RA in NAD, in 0/10 pain, agreeable to PT/OT evaluation.

## 2020-12-02 NOTE — PHYSICAL THERAPY INITIAL EVALUATION ADULT - PLANNED THERAPY INTERVENTIONS, PT EVAL
Pt is independent with functional mobility at this time, no inpatient PT needs required at this time. PT will no longer continue to follow

## 2020-12-02 NOTE — CONSULT NOTE ADULT - SUBJECTIVE AND OBJECTIVE BOX
[facility]                                     Neurology at [place]                                 Rhoda Xiao, & Stephon                                  [address]                                        [place], [place], [address]                                             [phone]    CC: possible stroke  HPI:  Neuro Critical Care Admit:     46y/o M with PMHx of HTN and hyperlipidemia presents to the ED c/o left sided numbness which began suddenly. Pt states that he was driving 2 hrs ago; at 2040 when he felt sudden onset of left sided diffuse numbness including facial, UE and LE with associated symptoms of difficulty moving arms, states they feel heavy. He has additional c/o nausea for past few days. Pt states that numbness has persisted and is still present during eval. No use of blood thinners, takes ASA. No pertinent FHx.   CVA/TIA symptoms warranting code stroke initiation.  CTA/P/Head neg for LVO or hemorrhage or acute CVA findings.  NIHSS is 2 and TPA given after d/w Dr. Karon Tsang.   Pt to be admitted to Neuro ICU accordingly.       The patient is a 45y Male who presented with acute onset of left sided numbness occurring about 2 hours prior to presentation to [facility] ED.  Also reported a heaviness in both arms.  He was evaluated and the on call stroke neurologist recommended alteplase which was given in the ED.  He had rapid resolution of his symptoms.  He has history of hyperlipidemia and takes medicine for it. Currently he is asymptomatic, neurology is asked to formally consult.    PAST MEDICAL & SURGICAL HISTORY:  hyperlipidemia    MEDICATIONS  (STANDING):  atorvastatin 40 milliGRAM(s) Oral at bedtime  dextrose 40% Gel 15 Gram(s) Oral once  dextrose 50% Injectable 25 Gram(s) IV Push once  glucagon  Injectable 1 milliGRAM(s) IntraMuscular once  influenza   Vaccine 0.5 milliLiter(s) IntraMuscular once  insulin lispro (ADMELOG) corrective regimen sliding scale   SubCutaneous three times a day before meals    MEDICATIONS  (PRN):  acetaminophen   Tablet .. 650 milliGRAM(s) Oral every 6 hours PRN Temp greater or equal to 38C (100.4F), Mild Pain (1 - 3)      Allergies    alteplase (Hives)    Intolerances      SOCIAL HISTORY:  no tob,   no alcohol   no drugs    FAMILY HISTORY:  no stroke in either parent      ROS: 14 point ROS negative other than what is present in HPI or below  transient left sided numbness    Vital Signs Last 24 Hrs  T(C): 36.6 (02 Dec 2020 11:00), Max: 37.1 (01 Dec 2020 22:37)  T(F): 97.9 (02 Dec 2020 11:00), Max: 98.8 (02 Dec 2020 00:46)  HR: 87 (02 Dec 2020 13:00) (61 - 89)  BP: 115/78 (02 Dec 2020 13:00) (98/63 - 151/77)  BP(mean): 90 (02 Dec 2020 13:00) (74 - 102)  RR: 17 (02 Dec 2020 13:00) (10 - 19)  SpO2: 99% (02 Dec 2020 13:00) (96% - 100%)      General: NAD    Detailed Neurologic Exam:    Mental status: The patient is awake and alert and has normal attention span.  The patient is fully oriented in 3 spheres. The patient is oriented to current events. The patient is able to name objects, follow commands, repeat sentences.    Cranial nerves: Pupils equal and react symmetrically to light. There is no visual field deficit to confrontation. Extraocular motion is full with no nystagmus. There is no ptosis. Facial sensation is intact. Facial musculature is symmetric. Palate elevates symmetrically. Tongue is midline.    Motor: There is normal bulk and tone.  There is no tremor.  Strength is 5/5 in the right arm and leg.   Strength is 5/5 in the left arm and leg.    Sensation: Intact to light touch and pin in 4 extremities    Reflexes: 2+ throughout and plantar responses are flexor.    Cerebellar: There is no dysmetria on finger to nose testing.    Gait : ambulated well    [facility] SS:  DATE: 12/2/2020  TIME: 1330  1A: Level of consciousness (0-3): 0  1B: Questions (0-2): 0  1C: Commands (0-2): 0  2: Gaze (0-2): 0  3: Visual fields (0-3): 0  4: Facial palsy (0-3): 0  MOTOR:  5A: Left arm motor drift (0-4): 0  5B: Right arm motor drift (0-4): 0  6A: Left leg motor drift (0-4): 0  6B: Right leg motor drift (0-4): 0  7: Limb ataxia (0-2): 0  SENSORY:  8: Sensation (0-2): 0  SPEECH:  9: Language (0-3): 0  10: Dysarthria (0-2): 0  EXTINCTION:  11: Extinction/inattention (0-2): 0    TOTAL SCORE:     prehospital mRS= 0      LABS:                         14.2   7.22  )-----------( 237      ( 02 Dec 2020 08:07 )             41.6       12-02    138  |  105  |  16.0  ----------------------------<  172<H>  4.2   |  20.0<L>  |  0.91    Ca    8.9      02 Dec 2020 08:07  Phos  3.2     12-02  Mg     2.0     12-02    TPro  8.4  /  Alb  4.7  /  TBili  0.5  /  DBili  x   /  AST  23  /  ALT  18  /  AlkPhos  67  12-01      PT/INR - ( 01 Dec 2020 22:26 )   PT: 13.6 sec;   INR: 1.18 ratio         PTT - ( 01 Dec 2020 22:26 )  PTT:32.2 sec    Lipid Profile (12.01.20 @ 22:26)    Cholesterol, Serum: 130 mg/dL    Triglycerides, Serum: 98 mg/dL    HDL Cholesterol, Serum: 40 mg/dL    Non HDL Cholesterol: 90:     LDL Cholesterol Calculated: 70 mg/dL        RADIOLOGY & ADDITIONAL STUDIES (independently reviewed unless otherwise noted):  CT head: no acute CVA, mass or blood   CTA head: no aneurysm, AVM, LVO or sig stenosis in COW  CTA neck: no sig carotid or vertebral stenosis  CT Perfusion head - CBF<30% volume 0ml, Tmax>6s volume =0ml    MRI brain- no acute stroke mass or blood. (+) old left cerebellar stroke                                  [facility]                                     Neurology at [place]                                 Rhoda Xiao, & Stephon                                  [address]                                        [place], [place], [address]                                             [phone]    CC: possible stroke  HPI:  The patient is a 45y Male who presented with acute onset of left sided numbness occurring about 2 hours prior to presentation to [facility] ED.  Also reported a heaviness in both arms.  He was evaluated and the on call stroke neurologist recommended alteplase which was given in the ED.  He had rapid resolution of his symptoms.  He has history of hyperlipidemia and takes medicine for it. Currently he is asymptomatic, neurology is asked to formally consult.    PAST MEDICAL & SURGICAL HISTORY:  hyperlipidemia    MEDICATIONS  (STANDING):  atorvastatin 40 milliGRAM(s) Oral at bedtime  dextrose 40% Gel 15 Gram(s) Oral once  dextrose 50% Injectable 25 Gram(s) IV Push once  glucagon  Injectable 1 milliGRAM(s) IntraMuscular once  influenza   Vaccine 0.5 milliLiter(s) IntraMuscular once  insulin lispro (ADMELOG) corrective regimen sliding scale   SubCutaneous three times a day before meals    MEDICATIONS  (PRN):  acetaminophen   Tablet .. 650 milliGRAM(s) Oral every 6 hours PRN Temp greater or equal to 38C (100.4F), Mild Pain (1 - 3)      Allergies    alteplase (Hives)    Intolerances      SOCIAL HISTORY:  no tob,   no alcohol   no drugs    FAMILY HISTORY:  no stroke in either parent      ROS: 14 point ROS negative other than what is present in HPI or below  transient left sided numbness    Vital Signs Last 24 Hrs  T(C): 36.6 (02 Dec 2020 11:00), Max: 37.1 (01 Dec 2020 22:37)  T(F): 97.9 (02 Dec 2020 11:00), Max: 98.8 (02 Dec 2020 00:46)  HR: 87 (02 Dec 2020 13:00) (61 - 89)  BP: 115/78 (02 Dec 2020 13:00) (98/63 - 151/77)  BP(mean): 90 (02 Dec 2020 13:00) (74 - 102)  RR: 17 (02 Dec 2020 13:00) (10 - 19)  SpO2: 99% (02 Dec 2020 13:00) (96% - 100%)      General: NAD    Detailed Neurologic Exam:    Mental status: The patient is awake and alert and has normal attention span.  The patient is fully oriented in 3 spheres. The patient is oriented to current events. The patient is able to name objects, follow commands, repeat sentences.    Cranial nerves: Pupils equal and react symmetrically to light. There is no visual field deficit to confrontation. Extraocular motion is full with no nystagmus. There is no ptosis. Facial sensation is intact. Facial musculature is symmetric. Palate elevates symmetrically. Tongue is midline.    Motor: There is normal bulk and tone.  There is no tremor.  Strength is 5/5 in the right arm and leg.   Strength is 5/5 in the left arm and leg.    Sensation: Intact to light touch and pin in 4 extremities    Reflexes: 2+ throughout and plantar responses are flexor.    Cerebellar: There is no dysmetria on finger to nose testing.    Gait : ambulated well    [facility] SS:  DATE: 12/2/2020  TIME: 1330  1A: Level of consciousness (0-3): 0  1B: Questions (0-2): 0  1C: Commands (0-2): 0  2: Gaze (0-2): 0  3: Visual fields (0-3): 0  4: Facial palsy (0-3): 0  MOTOR:  5A: Left arm motor drift (0-4): 0  5B: Right arm motor drift (0-4): 0  6A: Left leg motor drift (0-4): 0  6B: Right leg motor drift (0-4): 0  7: Limb ataxia (0-2): 0  SENSORY:  8: Sensation (0-2): 0  SPEECH:  9: Language (0-3): 0  10: Dysarthria (0-2): 0  EXTINCTION:  11: Extinction/inattention (0-2): 0    TOTAL SCORE:     prehospital mRS= 0      LABS:                         14.2   7.22  )-----------( 237      ( 02 Dec 2020 08:07 )             41.6       12-02    138  |  105  |  16.0  ----------------------------<  172<H>  4.2   |  20.0<L>  |  0.91    Ca    8.9      02 Dec 2020 08:07  Phos  3.2     12-02  Mg     2.0     12-02    TPro  8.4  /  Alb  4.7  /  TBili  0.5  /  DBili  x   /  AST  23  /  ALT  18  /  AlkPhos  67  12-01      PT/INR - ( 01 Dec 2020 22:26 )   PT: 13.6 sec;   INR: 1.18 ratio         PTT - ( 01 Dec 2020 22:26 )  PTT:32.2 sec    Lipid Profile (12.01.20 @ 22:26)    Cholesterol, Serum: 130 mg/dL    Triglycerides, Serum: 98 mg/dL    HDL Cholesterol, Serum: 40 mg/dL    Non HDL Cholesterol: 90:     LDL Cholesterol Calculated: 70 mg/dL        RADIOLOGY & ADDITIONAL STUDIES (independently reviewed unless otherwise noted):  CT head: no acute CVA, mass or blood   CTA head: no aneurysm, AVM, LVO or sig stenosis in COW  CTA neck: no sig carotid or vertebral stenosis  CT Perfusion head - CBF<30% volume 0ml, Tmax>6s volume =0ml    MRI brain- no acute stroke mass or blood. (+) old left cerebellar stroke

## 2020-12-03 ENCOUNTER — TRANSCRIPTION ENCOUNTER (OUTPATIENT)
Age: 45
End: 2020-12-03

## 2020-12-03 VITALS — TEMPERATURE: 98 F

## 2020-12-03 LAB
A1C WITH ESTIMATED AVERAGE GLUCOSE RESULT: 6.5 % — HIGH (ref 4–5.6)
ANION GAP SERPL CALC-SCNC: 10 MMOL/L — SIGNIFICANT CHANGE UP (ref 5–17)
BUN SERPL-MCNC: 17 MG/DL — SIGNIFICANT CHANGE UP (ref 8–20)
CALCIUM SERPL-MCNC: 9 MG/DL — SIGNIFICANT CHANGE UP (ref 8.6–10.2)
CHLORIDE SERPL-SCNC: 106 MMOL/L — SIGNIFICANT CHANGE UP (ref 98–107)
CO2 SERPL-SCNC: 24 MMOL/L — SIGNIFICANT CHANGE UP (ref 22–29)
CREAT SERPL-MCNC: 0.92 MG/DL — SIGNIFICANT CHANGE UP (ref 0.5–1.3)
ESTIMATED AVERAGE GLUCOSE: 140 MG/DL — HIGH (ref 68–114)
GLUCOSE SERPL-MCNC: 114 MG/DL — HIGH (ref 70–99)
HCT VFR BLD CALC: 39.4 % — SIGNIFICANT CHANGE UP (ref 39–50)
HGB BLD-MCNC: 13.4 G/DL — SIGNIFICANT CHANGE UP (ref 13–17)
MAGNESIUM SERPL-MCNC: 2.1 MG/DL — SIGNIFICANT CHANGE UP (ref 1.6–2.6)
MCHC RBC-ENTMCNC: 28.4 PG — SIGNIFICANT CHANGE UP (ref 27–34)
MCHC RBC-ENTMCNC: 34 GM/DL — SIGNIFICANT CHANGE UP (ref 32–36)
MCV RBC AUTO: 83.5 FL — SIGNIFICANT CHANGE UP (ref 80–100)
PHOSPHATE SERPL-MCNC: 3.8 MG/DL — SIGNIFICANT CHANGE UP (ref 2.4–4.7)
PLATELET # BLD AUTO: 223 K/UL — SIGNIFICANT CHANGE UP (ref 150–400)
POTASSIUM SERPL-MCNC: 4 MMOL/L — SIGNIFICANT CHANGE UP (ref 3.5–5.3)
POTASSIUM SERPL-SCNC: 4 MMOL/L — SIGNIFICANT CHANGE UP (ref 3.5–5.3)
RBC # BLD: 4.72 M/UL — SIGNIFICANT CHANGE UP (ref 4.2–5.8)
RBC # FLD: 12.7 % — SIGNIFICANT CHANGE UP (ref 10.3–14.5)
SARS-COV-2 IGG SERPL QL IA: NEGATIVE — SIGNIFICANT CHANGE UP
SARS-COV-2 IGM SERPL IA-ACNC: <0.1 INDEX — SIGNIFICANT CHANGE UP
SODIUM SERPL-SCNC: 140 MMOL/L — SIGNIFICANT CHANGE UP (ref 135–145)
T4 AB SER-ACNC: 7.5 UG/DL — SIGNIFICANT CHANGE UP (ref 4.5–12)
TSH SERPL-MCNC: 2.55 UIU/ML — SIGNIFICANT CHANGE UP (ref 0.27–4.2)
WBC # BLD: 9.77 K/UL — SIGNIFICANT CHANGE UP (ref 3.8–10.5)
WBC # FLD AUTO: 9.77 K/UL — SIGNIFICANT CHANGE UP (ref 3.8–10.5)

## 2020-12-03 PROCEDURE — 70450 CT HEAD/BRAIN W/O DYE: CPT

## 2020-12-03 PROCEDURE — 37195 THROMBOLYTIC THERAPY STROKE: CPT

## 2020-12-03 PROCEDURE — 99223 1ST HOSP IP/OBS HIGH 75: CPT

## 2020-12-03 PROCEDURE — 84443 ASSAY THYROID STIM HORMONE: CPT

## 2020-12-03 PROCEDURE — 84100 ASSAY OF PHOSPHORUS: CPT

## 2020-12-03 PROCEDURE — 36415 COLL VENOUS BLD VENIPUNCTURE: CPT

## 2020-12-03 PROCEDURE — 85025 COMPLETE CBC W/AUTO DIFF WBC: CPT

## 2020-12-03 PROCEDURE — C8929: CPT

## 2020-12-03 PROCEDURE — 84436 ASSAY OF TOTAL THYROXINE: CPT

## 2020-12-03 PROCEDURE — 97163 PT EVAL HIGH COMPLEX 45 MIN: CPT

## 2020-12-03 PROCEDURE — 71045 X-RAY EXAM CHEST 1 VIEW: CPT

## 2020-12-03 PROCEDURE — 86850 RBC ANTIBODY SCREEN: CPT

## 2020-12-03 PROCEDURE — 70551 MRI BRAIN STEM W/O DYE: CPT

## 2020-12-03 PROCEDURE — 86901 BLOOD TYPING SEROLOGIC RH(D): CPT

## 2020-12-03 PROCEDURE — 85730 THROMBOPLASTIN TIME PARTIAL: CPT

## 2020-12-03 PROCEDURE — 80048 BASIC METABOLIC PNL TOTAL CA: CPT

## 2020-12-03 PROCEDURE — 0042T: CPT

## 2020-12-03 PROCEDURE — 85027 COMPLETE CBC AUTOMATED: CPT

## 2020-12-03 PROCEDURE — 84484 ASSAY OF TROPONIN QUANT: CPT

## 2020-12-03 PROCEDURE — 82962 GLUCOSE BLOOD TEST: CPT

## 2020-12-03 PROCEDURE — 70498 CT ANGIOGRAPHY NECK: CPT

## 2020-12-03 PROCEDURE — 80061 LIPID PANEL: CPT

## 2020-12-03 PROCEDURE — 80053 COMPREHEN METABOLIC PANEL: CPT

## 2020-12-03 PROCEDURE — 83735 ASSAY OF MAGNESIUM: CPT

## 2020-12-03 PROCEDURE — 83036 HEMOGLOBIN GLYCOSYLATED A1C: CPT

## 2020-12-03 PROCEDURE — 86769 SARS-COV-2 COVID-19 ANTIBODY: CPT

## 2020-12-03 PROCEDURE — 93005 ELECTROCARDIOGRAM TRACING: CPT

## 2020-12-03 PROCEDURE — 99233 SBSQ HOSP IP/OBS HIGH 50: CPT

## 2020-12-03 PROCEDURE — 85610 PROTHROMBIN TIME: CPT

## 2020-12-03 PROCEDURE — 99291 CRITICAL CARE FIRST HOUR: CPT | Mod: 25

## 2020-12-03 PROCEDURE — 97167 OT EVAL HIGH COMPLEX 60 MIN: CPT

## 2020-12-03 PROCEDURE — U0003: CPT

## 2020-12-03 PROCEDURE — 86900 BLOOD TYPING SEROLOGIC ABO: CPT

## 2020-12-03 PROCEDURE — 70496 CT ANGIOGRAPHY HEAD: CPT

## 2020-12-03 PROCEDURE — 99232 SBSQ HOSP IP/OBS MODERATE 35: CPT

## 2020-12-03 RX ORDER — CHLORHEXIDINE GLUCONATE 213 G/1000ML
1 SOLUTION TOPICAL DAILY
Refills: 0 | Status: DISCONTINUED | OUTPATIENT
Start: 2020-12-03 | End: 2020-12-03

## 2020-12-03 RX ORDER — SODIUM CHLORIDE 9 MG/ML
1000 INJECTION INTRAMUSCULAR; INTRAVENOUS; SUBCUTANEOUS ONCE
Refills: 0 | Status: COMPLETED | OUTPATIENT
Start: 2020-12-03 | End: 2020-12-03

## 2020-12-03 RX ORDER — ENOXAPARIN SODIUM 100 MG/ML
40 INJECTION SUBCUTANEOUS DAILY
Refills: 0 | Status: DISCONTINUED | OUTPATIENT
Start: 2020-12-03 | End: 2020-12-03

## 2020-12-03 RX ORDER — ASPIRIN/CALCIUM CARB/MAGNESIUM 324 MG
81 TABLET ORAL DAILY
Refills: 0 | Status: DISCONTINUED | OUTPATIENT
Start: 2020-12-03 | End: 2020-12-03

## 2020-12-03 RX ADMIN — ENOXAPARIN SODIUM 40 MILLIGRAM(S): 100 INJECTION SUBCUTANEOUS at 14:01

## 2020-12-03 RX ADMIN — SODIUM CHLORIDE 1000 MILLILITER(S): 9 INJECTION INTRAMUSCULAR; INTRAVENOUS; SUBCUTANEOUS at 06:07

## 2020-12-03 RX ADMIN — Medication 81 MILLIGRAM(S): at 14:00

## 2020-12-03 NOTE — CONSULT NOTE ADULT - SUBJECTIVE AND OBJECTIVE BOX
[place] [facility]                                                               Office:  [address] [address]                                                              Telephone: [phone]. Fax:[phone]                                                                        CARDIOLOGY CONSULTATION NOTE                                                                                             Consult requested by: ANDREW Palacios  Reason for Consultation: Sttroke  History obtained by: Patient and medical record   obtained: No    Chief complaint:    Patient is a 45y old  Male who presents with a chief complaint of Acute CVA symptoms of left side numbness tingling and LLE weakness 4/5 (03 Dec 2020 11:38)        HPI:  Neuro Critical Care Admit:     46y/o M with PMHx of HTN and hyperlipidemia presents to the ED c/o left sided numbness which began suddenly. Pt states that he was driving 2 hrs ago; at 2040 when he felt sudden onset of left sided diffuse numbness including facial, UE and LE with associated symptoms of difficulty moving arms, states they feel heavy. He has additional c/o nausea for past few days. Pt states that numbness has persisted and is still present during eval. No use of blood thinners, takes ASA. No pertinent FHx.   CVA/TIA symptoms warranting code stroke initiation.  CTA/P/Head neg for LVO or hemorrhage or acute CVA findings.  NIHSS is 2 and TPA given after d/w Dr. Karon Tsang.   Pt to be admitted to Neuro ICU accordingly.     Above Appreciated  Cardiology consult requested for evaluation for cardiac embolic stroke. Pt states he is compliant with antihypertensives and stain therapy. Pt denies symptoms of chest pain, palpitations, or SOB. Pt denies any residual neuro focal deficits.     REVIEW OF SYMPTOMS:     CONSTITUTIONAL: No fever, weight loss, or fatigue  ENMT:  No difficulty hearing, tinnitus, vertigo; No sinus or throat pain  NECK: No pain or stiffness  CARDIOVASCULAR: See HPI  RESPIRATORY: No Dyspnea on exertion, Shortness of breath, cough, wheezing  : No dysuria, no hematuria   GI: No dark color stool, no melena, no diarrhea, no constipation, no abdominal pain   NEURO: No headache, no dizziness, no slurred speech   MUSCULOSKELETAL: No joint pain or swelling; No muscle, back, or extremity pain  PSYCH: No agitation, no anxiety.    ALL OTHER REVIEW OF SYSTEMS ARE NEGATIVE.      PREVIOUS DIAGNOSTIC TESTING  ECHO FINDINGS: < from: TTE Echo Complete w/ Contrast w/ Doppler (12.02.20 @ 20:17) >  Summary:   1. Normal left atrial size.   2. Color flow doppler and intravenous injection of agitated saline demonstrates the presence of an intact intra atrial septum.   3. Left ventricular ejection fraction, byvisual estimation, is 60 to 65%.   4. Normal right atrial size.   5. Normal right ventricular size and function.   6. No significant valvular abnormality.   7. There is no evidence of pericardial effusion.   8. No cardiac mass, vegetations, thrombus or shunts visualized. However, MARY is a more sensitive test to evaluate for these findings.              STRESS FINDINGS: < from: Nuclear Stress Test-Pharmacologic (08.20.20 @ 11:54) >  IMPRESSIONS:  * The left ventricle was normal in size.  * Tracer uptake was homogeneous throughout the left  ventricle.  * Normal study;no evidence for myocardial infarction or  ischemia.  * Gated wall motion analysis was performed, and shows  normal wall motion.        ALLERGIES: Allergies    alteplase (Hives)    Intolerances      PAST MEDICAL HISTORY  No pertinent past medical history    No pertinent past medical history      PAST SURGICAL HISTORY  No significant past surgical history    No significant past surgical history        FAMILY HISTORY:  No pertinent family history in first degree relatives        SOCIAL HISTORY:  Denies smoking/alcohol/drugs        CURRENT MEDICATIONS:  aspirin enteric coated  atorvastatin  chlorhexidine 2% Cloths  dextrose 40% Gel  dextrose 50% Injectable  enoxaparin Injectable  glucagon  Injectable  influenza   Vaccine        HOME MEDICATIONS:    cetirizine 10 mg oral tablet: 1 tab(s) orally once a day, As Needed (02 Dec 2020 12:32)  Flonase 50 mcg/inh nasal spray: 2 spray(s) nasal once a day (02 Dec 2020 12:32)      Vital Signs Last 24 Hrs  T(C): 36.8 (03 Dec 2020 08:25), Max: 36.8 (02 Dec 2020 16:06)  T(F): 98.3 (03 Dec 2020 08:25), Max: 98.3 (03 Dec 2020 08:25)  HR: 69 (03 Dec 2020 12:00) (59 - 90)  BP: 112/85 (03 Dec 2020 08:00) (85/62 - 139/79)  BP(mean): 92 (03 Dec 2020 08:00) (61 - 112)  RR: 19 (03 Dec 2020 12:00) (12 - 23)  SpO2: 99% (03 Dec 2020 12:00) (97% - 100%)      PHYSICAL EXAM:  Constitutional: Comfortable . No acute distress.   HEENT: Atraumatic and normocephalic , neck is supple . no JVD. No carotid bruit. PEERL   CNS: A&Ox3. No focal deficits. EOMI.   Lymph Nodes: Cervical : Not palpable.  Respiratory: CTAB  Cardiovascular: S1S2 RRR. No murmur/rubs or gallop.  Gastrointestinal: Soft non-tender and non distended . +Bowel sounds. negative Lopez's sign.  Extremities: No edema.   Psychiatric: Calm . no agitation.  Skin: No skin rash/ulcers visualized to face, hands or feet.    Intake and output:   12-02 @ 07:01  -  12-03 @ 07:00  --------------------------------------------------------  IN: 1720 mL / OUT: 2850 mL / NET: -1130 mL        LABS:                        13.4   9.77  )-----------( 223      ( 03 Dec 2020 06:27 )             39.4     12-03    140  |  106  |  17.0  ----------------------------<  114<H>  4.0   |  24.0  |  0.92    Ca    9.0      03 Dec 2020 06:27  Phos  3.8     12-03  Mg     2.1     12-03    TPro  8.4  /  Alb  4.7  /  TBili  0.5  /  DBili  x   /  AST  23  /  ALT  18  /  AlkPhos  67  12-01    CARDIAC MARKERS ( 01 Dec 2020 22:26 )  x     / <0.01 ng/mL / x     / x     / x        ;p-BNP=  PT/INR - ( 01 Dec 2020 22:26 )   PT: 13.6 sec;   INR: 1.18 ratio         PTT - ( 01 Dec 2020 22:26 )  PTT:32.2 sec      INTERPRETATION OF TELEMETRY: SB/NSR HR @ 50-60s  ECG:  NSR HR @ 80s    RADIOLOGY & ADDITIONAL STUDIES:    X-ray: < from: Xray Chest 1 View-PORTABLE IMMEDIATE (Xray Chest 1 View-PORTABLE IMMEDIATE .) (12.01.20 @ 22:57) >  FINDINGS:    Single frontal view of the chest demonstrates the lungs to be clear. The cardiomediastinal silhouette is normal. No acute osseous abnormalities. Overlying EKG leads and wires are noted    IMPRESSION: No acute cardiopulmonary disease process.      CT scan: < from: CT Head No Cont (12.02.20 @ 22:56) >    FINDINGS:  There is no CT evidence of acute intracranial hemorrhage, mass effect, midline shift or acute territorial infarct.    Is mild generalized cerebral volume loss.  A chronic infarct in the left cerebellar hemisphere and a chronic lacunar infarct located along the right subinsular region and more posterior aspect of the right lentiform nucleus.  Unchanged.      There is patchy, mild paranasal sinus mucosal thickening..    The mastoid air cells and middle ear cavities are clear.    The calvarium, skull base, and orbits appear within normal limits.    IMPRESSION:  No CT evidence of acute intracranial hemorrhage, mass effect or acute territorial infarction.        MRI: < from: MR Head No Cont (12.02.20 @ 10:52) >  FINDINGS:  There is no abnormal T2 prolongation signal in the white matter.    Small chronic infarct left cerebellum    There is no acute parenchymal hemorrhage, parenchymal mass, mass effect or midline shift. There is no extra-axial fluid collection.  There is no hydrocephalus.  There is no acute infarct.    Mucosal thickening paranasal sinuses    IMPRESSION:  No acute intracranial hemorrhage or acute infarct.

## 2020-12-03 NOTE — PROGRESS NOTE ADULT - SUBJECTIVE AND OBJECTIVE BOX
Chief complaint:   Patient is a 45y old  Male who presents with a chief complaint of Acute CVA symptoms of left side numbness tingling and LLE weakness 4/5 (02 Dec 2020 14:46)    HPI: 44y/o M with PMHx of HTN and hyperlipidemia presents to the ED c/o left sided numbness which began suddenly. Pt states that he was driving 2 hrs ago; at 2040 when he felt sudden onset of left sided diffuse numbness including facial, UE and LE with associated symptoms of difficulty moving arms, states they feel heavy. He has additional c/o nausea for past few days. Pt states that numbness has persisted and is still present during eval. No use of blood thinners, takes ASA. No pertinent FHx.   CVA/TIA symptoms warranting code stroke initiation.  CTA/P/Head neg for LVO or hemorrhage or acute CVA findings.  NIHSS is 2 and TPA given after d/w Dr. Karon Tsang.   Pt to be admitted to Neuro ICU accordingly.     NIHSS for ICU team:  4 points  (left Arm, leg weakness minor drift, decreased sensate on left, proprioception off)      24hr EVENTS:      ROS: [ ]  Unable to assess due to mental status   All other systems negative    -----------------------------------------------------------------------------------------------------------------------------------------------------------------------------------  ICU Vital Signs Last 24 Hrs  T(C): 36.6 (03 Dec 2020 04:33), Max: 36.8 (02 Dec 2020 16:06)  T(F): 97.9 (03 Dec 2020 04:33), Max: 98.2 (02 Dec 2020 16:06)  HR: 67 (03 Dec 2020 08:00) (59 - 90)  BP: 112/85 (03 Dec 2020 08:00) (85/62 - 139/79)  BP(mean): 92 (03 Dec 2020 08:00) (61 - 112)  ABP: --  ABP(mean): --  RR: 16 (03 Dec 2020 08:00) (12 - 23)  SpO2: 97% (03 Dec 2020 08:00) (96% - 100%)      I&O's Summary    02 Dec 2020 07:01  -  03 Dec 2020 07:00  --------------------------------------------------------  IN: 1720 mL / OUT: 2850 mL / NET: -1130 mL        MEDICATIONS  (STANDING):  aspirin enteric coated 81 milliGRAM(s) Oral daily  atorvastatin 40 milliGRAM(s) Oral at bedtime  dextrose 40% Gel 15 Gram(s) Oral once  dextrose 50% Injectable 25 Gram(s) IV Push once  enoxaparin Injectable 40 milliGRAM(s) SubCutaneous daily  glucagon  Injectable 1 milliGRAM(s) IntraMuscular once  influenza   Vaccine 0.5 milliLiter(s) IntraMuscular once  insulin lispro (ADMELOG) corrective regimen sliding scale   SubCutaneous three times a day before meals      RESPIRATORY:        IMAGING:   Recent imaging studies were reviewed.    LAB RESULTS:                          13.4   9.77  )-----------( 223      ( 03 Dec 2020 06:27 )             39.4       PT/INR - ( 01 Dec 2020 22:26 )   PT: 13.6 sec;   INR: 1.18 ratio         PTT - ( 01 Dec 2020 22:26 )  PTT:32.2 sec    12-03    140  |  106  |  17.0  ----------------------------<  114<H>  4.0   |  24.0  |  0.92    Ca    9.0      03 Dec 2020 06:27  Phos  3.8     12-03  Mg     2.1     12-03    TPro  8.4  /  Alb  4.7  /  TBili  0.5  /  DBili  x   /  AST  23  /  ALT  18  /  AlkPhos  67  12-01                -----------------------------------------------------------------------------------------------------------------------------------------------------------------------------------    PHYSICAL EXAM:  General: Calm, intubated  HEENT: MMM  Neuro:  -Mental status- No acute distress  -CN- PERRL 3mm, EOMI, tongue midline, face symmetric    CV: RRR  Pulm: clear to auscultation  Abd: Soft, nontender, nondistended  Ext: no noted edema in lower ext  Skin: warm, dry       Chief complaint:   Patient is a 45y old  Male who presents with a chief complaint of Acute CVA symptoms of left side numbness tingling and LLE weakness 4/5 (02 Dec 2020 14:46)    HPI: 46y/o M with PMHx of HTN and hyperlipidemia presents to the ED c/o left sided numbness which began suddenly. Pt states that he was driving 2 hrs ago; at 2040 when he felt sudden onset of left sided diffuse numbness including facial, UE and LE with associated symptoms of difficulty moving arms, states they feel heavy. He has additional c/o nausea for past few days. Pt states that numbness has persisted and is still present during eval. No use of blood thinners, takes ASA. No pertinent FHx.   CVA/TIA symptoms warranting code stroke initiation.  CTA/P/Head neg for LVO or hemorrhage or acute CVA findings.  NIHSS is 2 and TPA given after d/w Dr. Karon Tsang.   Pt to be admitted to Neuro ICU accordingly.     NIHSS for ICU team:  4 points  (left Arm, leg weakness minor drift, decreased sensate on left, proprioception off)    24hr EVENTS:  no issues    ROS: no acute issues  All other systems negative    -----------------------------------------------------------------------------------------------------------------------------------------------------------------------------------  ICU Vital Signs Last 24 Hrs  T(C): 36.6 (03 Dec 2020 04:33), Max: 36.8 (02 Dec 2020 16:06)  T(F): 97.9 (03 Dec 2020 04:33), Max: 98.2 (02 Dec 2020 16:06)  HR: 67 (03 Dec 2020 08:00) (59 - 90)  BP: 112/85 (03 Dec 2020 08:00) (85/62 - 139/79)  BP(mean): 92 (03 Dec 2020 08:00) (61 - 112)  ABP: --  ABP(mean): --  RR: 16 (03 Dec 2020 08:00) (12 - 23)  SpO2: 97% (03 Dec 2020 08:00) (96% - 100%)      I&O's Summary    02 Dec 2020 07:01  -  03 Dec 2020 07:00  --------------------------------------------------------  IN: 1720 mL / OUT: 2850 mL / NET: -1130 mL        MEDICATIONS  (STANDING):  aspirin enteric coated 81 milliGRAM(s) Oral daily  atorvastatin 40 milliGRAM(s) Oral at bedtime  dextrose 40% Gel 15 Gram(s) Oral once  dextrose 50% Injectable 25 Gram(s) IV Push once  enoxaparin Injectable 40 milliGRAM(s) SubCutaneous daily  glucagon  Injectable 1 milliGRAM(s) IntraMuscular once  influenza   Vaccine 0.5 milliLiter(s) IntraMuscular once  insulin lispro (ADMELOG) corrective regimen sliding scale   SubCutaneous three times a day before meals      IMAGING:   Recent imaging studies were reviewed.    LAB RESULTS:                          13.4   9.77  )-----------( 223      ( 03 Dec 2020 06:27 )             39.4       PT/INR - ( 01 Dec 2020 22:26 )   PT: 13.6 sec;   INR: 1.18 ratio         PTT - ( 01 Dec 2020 22:26 )  PTT:32.2 sec    12-03    140  |  106  |  17.0  ----------------------------<  114<H>  4.0   |  24.0  |  0.92    Ca    9.0      03 Dec 2020 06:27  Phos  3.8     12-03  Mg     2.1     12-03    TPro  8.4  /  Alb  4.7  /  TBili  0.5  /  DBili  x   /  AST  23  /  ALT  18  /  AlkPhos  67  12-01        -----------------------------------------------------------------------------------------------------------------------------------------------------------------------------------  PHYSICAL EXAM:  General: Calm, laying in bed  HEENT: MMM  Neuro:  -Mental status- No acute distress, AOx3, conversational, following commands  -CN- PERRL 3mm, EOMI, tongue midline, face symmetric  -Motor- full strength in all ext  -Sensation- intact to LT   -Coordination- no dysmetria noted    CV: RRR  Pulm: Clear to auscultation  Abd: Soft, nontender, nondistended  Ext: No edema  Skin: warm, dry

## 2020-12-03 NOTE — DISCHARGE NOTE PROVIDER - NSDCMRMEDTOKEN_GEN_ALL_CORE_FT
Aspir 81 oral delayed release tablet: 1 tab(s) orally once a day   atorvastatin 40 mg oral tablet: 1 tab(s) orally once a day   cetirizine 10 mg oral tablet: 1 tab(s) orally once a day, As Needed  Flonase 50 mcg/inh nasal spray: 2 spray(s) nasal once a day

## 2020-12-03 NOTE — DISCHARGE NOTE PROVIDER - CARE PROVIDER_API CALL
Daryl Duong  NEUROLOGY  [address], [address]  [place] [address]  Phone: [phone]  Fax: [phone]  Follow Up Time: 1 week    Danial Griffin  CARDIOVASCULAR DISEASE  [address], [place] [address]  Phone: [phone]  Fax: [phone]  Follow Up Time: 1 week

## 2020-12-03 NOTE — DISCHARGE NOTE PROVIDER - NSDCFUADDAPPT_GEN_ALL_CORE_FT
You will follow up with Nurse Practitioner Noelle Dougherty in Dr. Duong's office in 1 week to review secondary stroke risk factor modification

## 2020-12-03 NOTE — DISCHARGE NOTE NURSING/CASE MANAGEMENT/SOCIAL WORK - PATIENT PORTAL LINK FT
You can access the FollowMyHealth Patient Portal offered by [facility] by registering at the following website: [email or web address]. By joining [employer]’s FollowMyHealth portal, you will also be able to view your health information using other applications (apps) compatible with our system.

## 2020-12-03 NOTE — DISCHARGE NOTE NURSING/CASE MANAGEMENT/SOCIAL WORK - NSDCPEPTSTRK_GEN_ALL_CORE
Signs and symptoms of stroke/Prescribed medications/Risk factors for stroke/Call 911 for stroke/Stroke support groups for patients, families, and friends/Need for follow up after discharge/Stroke education booklet/Stroke warning signs and symptoms

## 2020-12-03 NOTE — DISCHARGE NOTE PROVIDER - HOSPITAL COURSE
44y/o M with PMHx of HTN and hyperlipidemia presents to the ED c/o left sided numbness which began suddenly. Pt states that he was driving 2 hrs ago; at 2040 when he felt sudden onset of left sided diffuse numbness including facial, UE and LE with associated symptoms of difficulty moving arms, states they feel heavy. He has additional c/o nausea for past few days. Pt states that numbness has persisted and is still present during eval. No use of blood thinners, takes ASA. No pertinent FHx.   CVA/TIA symptoms warranting code stroke initiation.  CTA/P/Head neg for LVO or hemorrhage or acute CVA findings.  NIHSS is 2 and TPA given on 12/1/2020 at 1045pm after d/w Dr. Karon Tsang. 24 hr CT post TPA negative for hemorrhagic conversion. MRI: no acute intracranial hemorrhage or acute infarct. echo: ef 60-65%, no cardiac mass, vegetations or shunts. PT/OT cleared patient to go home with no anticipated PT/OT needs. Patient requesting to go home and does not want to stay 1 more day for MARY/ loop recorder. Patient advised by cardiologist to follow up with cardiology in 1 week to plan for MARY and loop recorder implant.

## 2020-12-03 NOTE — CONSULT NOTE ADULT - ASSESSMENT
46y/o M with PMHx of HTN and hyperlipidemia presents to the ED c/o left sided numbness which began suddenly. Pt states that he was driving 2 hrs ago; at 2040 when he felt sudden onset of left sided diffuse numbness including facial, UE and LE with associated symptoms of difficulty moving arms, states they feel heavy. He has additional c/o nausea for past few days. Pt states that numbness has persisted and is still present during eval. No use of blood thinners, takes ASA. No pertinent FHx.   CVA/TIA symptoms warranting code stroke initiation.  CTA/P/Head neg for LVO or hemorrhage or acute CVA findings.  NIHSS is 2 and TPA given after d/w Dr. Karon Tsang.   Pt to be admitted to Neuro ICU accordingly.     Above Appreciated  Cardiology consult requested for evaluation for cardiac embolic stroke. Pt states he is compliant with antihypertensives and stain therapy. Pt denies symptoms of chest pain, palpitations, or SOB. Pt denies any residual neuro focal deficits.       R/O Cardiac Embolic Stroke  -CT HEAD-No CT evidence of acute intracranial hemorrhage  -MR Head-No acute intracranial hemorrhage or acute infarct  -Echo- EF 60-65%, no cardiac mass, vegetations, thrombus or shunts  -Pt presents with no residual neuro deficits  -d/w pt at length that MARY would be next step in ruling out cardiac embolic cause of TIA and if negative, plan for loop recorder implant  -Pt verbalized understanding but does not wish to remain in the hospital and wishes to follow up in the office for MARY and possible loop recorder  -Pt is advised to follow up in outpt cardiology office within one week to complete MARY and possible loop  -Pt verbalized understanding and agrees with plan to follow up as outpt

## 2020-12-03 NOTE — PROGRESS NOTE ADULT - ASSESSMENT
The patient is a 45y Male who is followed by neurology because of numbness    Stroke/TIA  s/p alteplase, symptoms resolved  per NSICU he had echo last night but I can't see results  repeat 24 hour post alteplase CT head no blood  neuro symptoms fully resolves    he can be discharged but needs to have cardiology eval as outpatient set up for MARY and possible ILR  he should be discharged on lipitor and instructions for diet given hyperlipedemia ansd HgbA1c 6.5%    He should have follow up set up with Noelle Dougherty NP stroke/neurology post discharge to review secondary stroke risk factor modification within one week    Discussed with ANDREW Mcbride for NSICU    Thank you for allowing me to participate in the care of your patient    Daryl Duong MD, PhD   [id number]

## 2020-12-03 NOTE — CONSULT NOTE ADULT - ATTENDING COMMENTS
Pt is seen, examined, chart reviewed, d/w np/pa.  Management as outlined above.  R/O Cardiac Embolic Stroke  MARY and ILR recommended

## 2020-12-03 NOTE — DISCHARGE NOTE PROVIDER - INSTRUCTIONS
DASH stands for Dietary Approaches to Stop Hypertension.  The DASH diet emphasizes vegetables, fruits and low-fat dairy foods — and moderate amounts of whole grains, fish, poultry and nuts.  •Lower sodium DASH diet. You can consume up to 1,500 mg of sodium a day.  Grains: 6 to 8 servings a day  Grains include bread, cereal, rice and pasta. Examples of one serving of grains include 1 slice whole-wheat bread, 1 ounce dry cereal, or 1/2 cup cooked cereal, rice or pasta.  Vegetables: 4 to 5 servings a day  Fruits: 4-5 servings a day. Example: one medium size fruit, 1/2 cup frozen or canned fruit    Lean meat, poultry and fish: 6 one-ounce servings or fewer a day- Grilled, no skin

## 2020-12-03 NOTE — DISCHARGE NOTE PROVIDER - NSDCCPCAREPLAN_GEN_ALL_CORE_FT
PRINCIPAL DISCHARGE DIAGNOSIS  Diagnosis: Cerebrovascular accident (CVA), unspecified mechanism  Assessment and Plan of Treatment: You received TPA on 12/1/2020. TPA is an intravenous medication given for ischemic stroke (which is a stroke caused by a blood clot). You had a CT scan of your head 24 hours post TPA was given which no hemorrhagic conversion was seen. You had an MRI of your brain in which no acute stroke or bleeding was seen on your scans. Cardiology was consulted and would like to perform a Transesphogeal Echocardiogram at [facility] to rule out cardioembolic source of stroke. You need to follow up with Cardiologist within 1 week of discharge.      SECONDARY DISCHARGE DIAGNOSES  Diagnosis: Allergic reaction to drug, initial encounter  Assessment and Plan of Treatment:      PRINCIPAL DISCHARGE DIAGNOSIS  Diagnosis: Cerebrovascular accident (CVA), unspecified mechanism  Assessment and Plan of Treatment: You received TPA on 12/1/2020. TPA is an intravenous medication given for ischemic stroke (which is a stroke caused by a blood clot). You had a CT scan of your head 24 hours post TPA was given which no hemorrhagic conversion was seen. You had an MRI of your brain in which no acute stroke or bleeding was seen on your scans. Cardiology was consulted and would like to perform a Trans[facility]phogeal Echocardiogram at [facility] to rule out cardioembolic source of stroke. You need to follow up with Cardiologist within 1 week of discharge.

## 2020-12-03 NOTE — PROGRESS NOTE ADULT - SUBJECTIVE AND OBJECTIVE BOX
[facility]                                     Neurology at [place]                                 Rhoda Xiao, & Stephon                                  [address]                                        [place], [place], [address]                                             [phone]    CC: possible stroke  HPI: The patient is a 45y Male who presented with acute onset of left sided numbness occurring about 2 hours prior to presentation to [facility] ED.  Also reported a heaviness in both arms.  He was evaluated and the on call stroke neurologist recommended alteplase which was given in the ED.  He had rapid resolution of his symptoms.  He has history of hyperlipidemia and takes medicine for it. Currently he is asymptomatic, neurology is asked to formally consult.\    Interval history: back to neuro baseline    Review of systems (neurology): Denies headache or dizziness. Denies weakness/numbness.  Denies speech/language deficits. Denies diplopia/blurred vision.  Denies confusion    MEDICATIONS  (STANDING):  aspirin enteric coated 81 milliGRAM(s) Oral daily  atorvastatin 40 milliGRAM(s) Oral at bedtime  chlorhexidine 2% Cloths 1 Application(s) Topical daily  dextrose 40% Gel 15 Gram(s) Oral once  dextrose 50% Injectable 25 Gram(s) IV Push once  enoxaparin Injectable 40 milliGRAM(s) SubCutaneous daily  glucagon  Injectable 1 milliGRAM(s) IntraMuscular once  influenza   Vaccine 0.5 milliLiter(s) IntraMuscular once    MEDICATIONS  (PRN):  acetaminophen   Tablet .. 650 milliGRAM(s) Oral every 6 hours PRN Temp greater or equal to 38C (100.4F), Mild Pain (1 - 3)      Vital Signs Last 24 Hrs  T(C): 36.8 (03 Dec 2020 08:25), Max: 36.8 (02 Dec 2020 16:06)  T(F): 98.3 (03 Dec 2020 08:25), Max: 98.3 (03 Dec 2020 08:25)  HR: 67 (03 Dec 2020 08:00) (59 - 90)  BP: 112/85 (03 Dec 2020 08:00) (85/62 - 139/79)  BP(mean): 92 (03 Dec 2020 08:00) (61 - 112)  RR: 16 (03 Dec 2020 08:00) (12 - 23)  SpO2: 97% (03 Dec 2020 08:00) (97% - 100%)    General: NAD    Detailed Neurologic Exam:    Mental status: The patient is awake and alert and has normal attention span.  The patient is fully oriented in 3 spheres. The patient is oriented to current events. The patient is able to name objects, follow commands, repeat sentences.    Cranial nerves: Pupils equal and react symmetrically to light. There is no visual field deficit to confrontation. Extraocular motion is full with no nystagmus. There is no ptosis. Facial sensation is intact. Facial musculature is symmetric. Palate elevates symmetrically. Tongue is midline.    Motor: There is normal bulk and tone.  There is no tremor.  Strength is 5/5 in the right arm and leg.   Strength is 5/5 in the left arm and leg.    Sensation: Intact to light touch and pin in 4 extremities    Reflexes: 2+ throughout and plantar responses are flexor.    Cerebellar: There is no dysmetria on finger to nose testing.    Gait : ambulated well        LABS:                                    13.4   9.77  )-----------( 223      ( 03 Dec 2020 06:27 )             39.4     12-03    140  |  106  |  17.0  ----------------------------<  114<H>  4.0   |  24.0  |  0.92    Ca    9.0      03 Dec 2020 06:27  Phos  3.8     12-03  Mg     2.1     12-03    TPro  8.4  /  Alb  4.7  /  TBili  0.5  /  DBili  x   /  AST  23  /  ALT  18  /  AlkPhos  67  12-01    LIVER FUNCTIONS - ( 01 Dec 2020 22:26 )  Alb: 4.7 g/dL / Pro: 8.4 g/dL / ALK PHOS: 67 U/L / ALT: 18 U/L / AST: 23 U/L / GGT: x           PT/INR - ( 01 Dec 2020 22:26 )   PT: 13.6 sec;   INR: 1.18 ratio         PTT - ( 01 Dec 2020 22:26 )  PTT:32.2 sec    Lipid Profile (12.01.20 @ 22:26)    Cholesterol, Serum: 130 mg/dL    Triglycerides, Serum: 98 mg/dL    HDL Cholesterol, Serum: 40 mg/dL    Non HDL Cholesterol: 90:     LDL Cholesterol Calculated: 70 mg/dL    A1C with Estimated Average Glucose (12.03.20 @ 06:27)    A1C with Estimated Average Glucose Result: 6.5 %    Estimated Average Glucose: 140 mg/dL        RADIOLOGY & ADDITIONAL STUDIES (independently reviewed unless otherwise noted):  CT head: no acute CVA, mass or blood   CTA head: no aneurysm, AVM, LVO or sig stenosis in COW  CTA neck: no sig carotid or vertebral stenosis  CT Perfusion head - CBF<30% volume 0ml, Tmax>6s volume =0ml    MRI brain- no acute stroke mass or blood. (+) old left cerebellar stroke    Repeat CT head 24 hours post alteplase did not show stroke mass or blood

## 2020-12-03 NOTE — DISCHARGE NOTE PROVIDER - PROVIDER TOKENS
PROVIDER:[TOKEN:[[id number]],FOLLOWUP:[1 week]],PROVIDER:[TOKEN:[[id number]],FOLLOWUP:[1 week]]

## 2020-12-03 NOTE — PROGRESS NOTE ADULT - ASSESSMENT
A/P:     46y/o M with PMHx of HTN and hyperlipidemia presents to the ED c/o left sided numbness and Left LE weakness. NIHSS 4.  s/p TPA and admit to Neuro ICU    Problem List:  - Acute CVA  - HTN  - HLD    Plan:  (Care plan below d/w Attending Intensivist)  NEURO:  NIHSS 4 and GCS E4V5M6  - Neuro checks and vitals q1 hr  - Antiseizure prophylaxis  - HOB 30 degrees  - Immediate CTH if any change in neuro status  - MRI/A brain-carotids  - CTH at 9pm on 12/2 post TPA protocol.  - ASA dosing initiated  - Lipitor 80mg vs 40mg depending on LDL profile in AM    PULMONARY:  - RA  - CARLY  - Pt encouraged to cough / take deep breaths, use incentive spirometry    CARDIAC:   - Goal SBP,  Keep sbp < 180 s/p TPA some permissive HTN allowed  - Echo TTE with bubble study:   - Outpt and Inpatient Meds to be continued    RENAL:   - I/O: Strict, keep Euvolemic  - Aim for Na+ goals above 140  - Electrolyte supplement as needed  - IVF's NS at 50cc/hr    GI/:  - Diet will eval in AM for official dysphagia screen  - For now will aim for NPO x meds  - Passed bedside swallow without issue  - Bowel Regiment with senna / miralax    HEME:  - H/H :   14/42  - Platelets  246  - continue to monitor will transfuse if H/H falls below 7 or there is evidence of active bleeding    ID:  - WBC  10.42  - Tmax  98  - Urinalysis in ED midstream  - Cultures only if spikes fever  - Covid negative    ENDOCRINE:  - CARLY  - BG goals 140-180  - Electrolyte supplement as needed    MUSKULOSKELETAL:  - CARLY    DVT/GI PPX:  - SCD'S bilaterally  - Serial Duplex  - Hold Lovenox / Heparin SQ dosing at this time until 24 hour post TPA CTH  - GI prophylaxis  - Caprini evaluated daily overall risk greater than 5.  Will determine daily chemical DVT prophylaxis goals    PSYCH:  - CARLY    LINES:  - PIV  - voiding    CONSULTATIONS:  Neurology  medicine    Total Critical Care Time spent > 62 MINUTES IN EVALUATING PATIENT, MEDICAL RECORD, IMAGING STUDIES AND IMPLEMENTING CARE PLAN INITIATIVES TO PRESERVE OPTIMAL BRAIN FUNCTION WHILE LIMITING FURTHER NEUROLOGICAL INJURY AND OR NEURO-AXIS COLLAPSE A/P:     46y/o M with PMHx of HTN and hyperlipidemia presents to the ED c/o left sided numbness and Left LE weakness. NIHSS 4.  s/p TPA and admit to Neuro ICU    Problem List:  - Acute CVA  - HTN  - HLD    Plan:  (Care plan below d/w Attending Intensivist)  NEURO:  NIHSS 4 and GCS E4V5M6  - Neuro checks q4  - MRI/A brain-carotids  - CTH at 9pm on 12/2 post TPA protocol.  - ASA dosing initiated  - Lipitor 80mg vs 40mg depending on LDL profile in AM    PULMONARY:  - RA  - CARLY  - Pt encouraged to cough / take deep breaths, use incentive spirometry    CARDIAC:   - Goal SBP<160  - Echo TTE with bubble study:   - Outpt and Inpatient Meds to be continued    RENAL:   - voiding    GI/:  advance diet as tolerated  - Bowel Regiment with senna / miralax    HEME:  SCDs and lovenox    ID:  - WBC  10.42  - Tmax  98  - Urinalysis in ED midstream  - Cultures only if spikes fever  - Covid negative    ENDOCRINE:  - CARLY  - BG goals 140-180  - ISS    MUSKULOSKELETAL:  - CARLY     A/P:     44y/o M with PMHx of HTN and hyperlipidemia presents to the ED c/o left sided numbness and Left LE weakness. NIHSS 4.  s/p TPA and admit to Neuro ICU    Problem List:  - Acute CVA  - HTN  - HLD    Plan:  (Care plan below d/w Attending Intensivist)  NEURO:  NIHSS 4 and GCS E4V5M6  - Neuro checks q4  - MRI/A brain-carotids  - CTH at 9pm on 12/2 post TPA protocol.  - ASA dosing initiated  - Lipitor 80mg vs 40mg depending on LDL profile in AM    PULMONARY:  - RA  - CARLY  - Pt encouraged to cough / take deep breaths, use incentive spirometry    CARDIAC:   - Goal SBP<160  - Echo TTE with bubble study:   - Outpt and Inpatient Meds to be continued    RENAL:   - voiding    GI/:  advance diet as tolerated  - Bowel Regiment with senna / miralax    HEME:  SCDs and lovenox    ID:  - Covid negative    ENDOCRINE:  - CARLY  - BG goals 140-180  - ISS    MUSKULOSKELETAL:  - CARLY

## 2020-12-10 ENCOUNTER — NON-APPOINTMENT (OUTPATIENT)
Age: 45
End: 2020-12-10

## 2020-12-10 DIAGNOSIS — Z82.49 FAMILY HISTORY OF ISCHEMIC HEART DISEASE AND OTHER DISEASES OF THE CIRCULATORY SYSTEM: ICD-10-CM

## 2020-12-10 DIAGNOSIS — Z78.9 OTHER SPECIFIED HEALTH STATUS: ICD-10-CM

## 2020-12-10 DIAGNOSIS — Z83.3 FAMILY HISTORY OF DIABETES MELLITUS: ICD-10-CM

## 2020-12-11 ENCOUNTER — NON-APPOINTMENT (OUTPATIENT)
Age: 45
End: 2020-12-11

## 2020-12-11 ENCOUNTER — APPOINTMENT (OUTPATIENT)
Dept: CARDIOLOGY | Facility: CLINIC | Age: 45
End: 2020-12-11
Payer: MEDICAID

## 2020-12-11 ENCOUNTER — APPOINTMENT (OUTPATIENT)
Dept: NEUROLOGY | Facility: CLINIC | Age: 45
End: 2020-12-11
Payer: MEDICAID

## 2020-12-11 VITALS
HEART RATE: 62 BPM | DIASTOLIC BLOOD PRESSURE: 72 MMHG | HEIGHT: 66 IN | TEMPERATURE: 97.6 F | SYSTOLIC BLOOD PRESSURE: 106 MMHG | OXYGEN SATURATION: 96 %

## 2020-12-11 VITALS
SYSTOLIC BLOOD PRESSURE: 98 MMHG | BODY MASS INDEX: 30.81 KG/M2 | TEMPERATURE: 98.2 F | HEART RATE: 70 BPM | OXYGEN SATURATION: 99 % | DIASTOLIC BLOOD PRESSURE: 60 MMHG | HEIGHT: 66.5 IN | WEIGHT: 194 LBS

## 2020-12-11 DIAGNOSIS — E11.9 TYPE 2 DIABETES MELLITUS W/OUT COMPLICATIONS: ICD-10-CM

## 2020-12-11 DIAGNOSIS — E78.5 HYPERLIPIDEMIA, UNSPECIFIED: ICD-10-CM

## 2020-12-11 DIAGNOSIS — I25.10 ATHEROSCLEROTIC HEART DISEASE OF NATIVE CORONARY ARTERY W/OUT ANGINA PECTORIS: ICD-10-CM

## 2020-12-11 DIAGNOSIS — I63.9 CEREBRAL INFARCTION, UNSPECIFIED: ICD-10-CM

## 2020-12-11 PROBLEM — Z82.49 FAMILY HISTORY OF CARDIAC DISORDER: Status: ACTIVE | Noted: 2020-12-11

## 2020-12-11 PROBLEM — Z83.3 FAMILY HISTORY OF DIABETES MELLITUS: Status: ACTIVE | Noted: 2020-12-11

## 2020-12-11 PROBLEM — Z78.9 NON-SMOKER: Status: ACTIVE | Noted: 2020-12-11

## 2020-12-11 PROCEDURE — 99072 ADDL SUPL MATRL&STAF TM PHE: CPT

## 2020-12-11 PROCEDURE — 93000 ELECTROCARDIOGRAM COMPLETE: CPT

## 2020-12-11 PROCEDURE — 99215 OFFICE O/P EST HI 40 MIN: CPT

## 2020-12-11 PROCEDURE — 99214 OFFICE O/P EST MOD 30 MIN: CPT

## 2020-12-11 RX ORDER — ATORVASTATIN CALCIUM 40 MG/1
40 TABLET, FILM COATED ORAL
Qty: 90 | Refills: 1 | Status: ACTIVE | COMMUNITY
Start: 1900-01-01 | End: 1900-01-01

## 2020-12-11 NOTE — HISTORY OF PRESENT ILLNESS
[FreeTextEntry1] : PCP: Dr Erica Stewart\par \par Mr. Andrews is a 45 year-old RH man with PMH of HLD that presents to the office today for a post hospitalization evaluation. He presented to the ED at [facility] on 12/1/20 with c/o acute onset left sided numbness. Patient had been driving two hours prior to admission when felt sudden onset of left sided diffuse numbness including facial, UE and LE with associated symptoms of difficulty moving arms and an associated heaviness feeling. In the ED he had additional c/o nausea for past few days. tPA was administered and patient was transferred to the ICU. His symptoms resolved and he was discharged on 12/3/20 and instructed to follow-up with cardiology for MARY and possible ILR placement. He has been feeling well since discharge but notes new blurry vision and difficulty driving at night. He report feeling anxious and depressed at times, short term memory loss and difficulty with concentration. He works as a  and does not forget where he's going or get lost.  He denies headache, unilateral weakness or decreased sensation, difficulty walking or problems with coordination or falls.  \par \par Hospital Work-Up\par MRI head w/o: No acute intracranial hemorrhage or acute infarct. A chronic infarct in the left cerebellar hemisphere and a chronic lacunar infarct located along the right subinsular region and more posterior aspect of the right lentiform nucleus.\par CTP: No core infarct or penumbra of ischemic tissues identified by CT technique.  \par CTA neck: The cervical vasculature is patent without evidence of dissection.  There is calcified and noncalcified plaque at the left carotid bifurcation.  There is no hemodynamically significant carotid stenosis or flow-limiting vertebral artery stenosis. Approximately 2 cm right thyroid nodule.  If this has not been previously characterized, a nonemergent thyroid ultrasound should be obtained as an outpatient. Mild enlargement adenoids.  Nonspecific 7 mm cystic focus in the right palatine tonsil.\par CTA brain: No vessel occlusion, flow-limiting stenosis or aneurysm is identified about the [place].  MRI is more sensitive for acute ischemia.  Follow-up MRI is recommended for further evaluation.\par TTE: EF 65-70%, No cardiac mass, vegetations, thrombus or shunts visualized. \par Hgb A1C 6.5%, TG 98, HDL 40, LDL 70

## 2020-12-11 NOTE — PHYSICAL EXAM
[FreeTextEntry1] : GENERAL PHYSICAL EXAM:\par GEN: no distress, normal affect\par HEENT: NCAT, OP clear\par EYES: sclera white, conjunctiva clear, no nystagmus\par NECK: supple\par CV: RRR 		\par PULM: CTAB, no wheezing\par GI: soft ABD, +BS, NT, ND\par EXT: peripheral pulse intact, no cyanosis\par MSK: muscle tone and strength normal\par SKIN: warm, dry, no rash or lesion on exposed skin \par \par NEUROLOGICAL EXAM:\par Mental Status\par Orientation: alert and oriented to person, place, time, and situation, 2/3 recall after 5 minutes\par Language: clear and fluent, intact comprehension and repetition, intact naming and reading\par \par Cranial Nerves\par II: full visual fields intact \par III, IV, VI: PERRL, EOMI\par V, VII: facial sensation and movement intact and symmetric \par VIII: hearing intact \par IX, X: uvula midline, soft palate elevates normally \par XI: BL shoulder shrug intact \par XII: tongue midline\par \par Motor\par Shoulder abd: 5 (R), 5 (L)\par EF/EE: 5 (R), 5 (L)\par WF/WE: 5 (R), 5 (L)\par hand : 5 (R), 5 (L)\par HF/HE: 5 (R), 5 (L)\par KF/KE: 5 (R), 5 (L)\par DF/PF: 5 (R), 5 (L) \par Tone and bulk are normal in upper and lower limbs\par No pronator drift\par \par Sensation\par Intact to light touch and vibration in all 4 EXTs\par \par Reflex\par 2+ in BL biceps, brachioradialis, patella\par \par Coordination\par Normal FTN bilaterally\par \par Gait\par Normal stance, stride, and pivot turn\par Tandem walk intact\par Negative Romberg\par \par

## 2020-12-11 NOTE — CONSULT LETTER
[FreeTextEntry1] : \par I had the pleasure of seeing your patient, ORIANA GOMEZ, in my office today. Please see my note below. \par \par If you have any questions, please do not hesitate to contact me.\par \par \par \par \par \par \par \par \par \par \par   [FreeTextEntry3] : Noelle Dougherty NP\par [facility] [place]\par [address] [place] [address]\par Phone: [phone]; Fax: [phone]

## 2020-12-11 NOTE — DISCUSSION/SUMMARY
[FreeTextEntry1] : NIHSS=0\par mRS=0.\par \par Mr. Andrews is a 45 year-old man with PMH of HLD that presented to the office for post hospitalization evaluation s/p hospitalization for stroke-like symptoms and received TPA. MRI head negative for acute infarct, chronic infarct present in the left cerebellar hemisphere and a right subinsular region and more posterior aspect of the right lentiform nucleus. Plan to continue ASA 81mg QD and Atorvastatin 40mg QHS for secondary stroke prevention. Patient to follow-up with cardiology later today for MARY and ILR if indicated. Referral made to neuro opthalmology for vision change s/p CVA. Follow-up with me in 1 month. \par \par We discussed modifiable vs. non-modifiable risk factors for stroke and the importance of aggressive risk factor modification for secondary stroke prevention, including antithrombotic therapy with aspirin, intensive blood pressure control and lipid lowering therapy. I encouraged the patient to discuss this with his primary care doctor. I instructed him to follow-up with his PCP regarding thyroid nodule on CT scan. I have reviewed the goals of stroke risk factor modification. I counseled the patient on measures to reduce stroke risk, including the importance of medication compliance, risk factor control, exercise, healthy diet and avoidance of smoking. I reviewed stroke warning signs, symptoms and appropriate actions to take if such occur. All of patient's questions and concerns were addressed. Patient advised to call the office with any changes and if any questions or concerns arise.

## 2020-12-27 ENCOUNTER — EMERGENCY (EMERGENCY)
Facility: HOSPITAL | Age: 45
LOS: 1 days | Discharge: HOME | End: 2020-12-27
Attending: STUDENT IN AN ORGANIZED HEALTH CARE EDUCATION/TRAINING PROGRAM
Payer: MEDICAID

## 2020-12-27 VITALS
SYSTOLIC BLOOD PRESSURE: 122 MMHG | HEART RATE: 72 BPM | OXYGEN SATURATION: 98 % | RESPIRATION RATE: 16 BRPM | DIASTOLIC BLOOD PRESSURE: 74 MMHG

## 2020-12-27 VITALS
RESPIRATION RATE: 16 BRPM | WEIGHT: 190.04 LBS | SYSTOLIC BLOOD PRESSURE: 123 MMHG | HEART RATE: 67 BPM | OXYGEN SATURATION: 99 % | TEMPERATURE: 99 F | DIASTOLIC BLOOD PRESSURE: 78 MMHG | HEIGHT: 67 IN

## 2020-12-27 LAB
ALBUMIN SERPL ELPH-MCNC: 4.1 G/DL — SIGNIFICANT CHANGE UP (ref 3.3–5.2)
ALP SERPL-CCNC: 57 U/L — SIGNIFICANT CHANGE UP (ref 40–120)
ALT FLD-CCNC: 12 U/L — SIGNIFICANT CHANGE UP
ANION GAP SERPL CALC-SCNC: 9 MMOL/L — SIGNIFICANT CHANGE UP (ref 5–17)
AST SERPL-CCNC: 21 U/L — SIGNIFICANT CHANGE UP
BASOPHILS # BLD AUTO: 0.02 K/UL — SIGNIFICANT CHANGE UP (ref 0–0.2)
BASOPHILS NFR BLD AUTO: 0.3 % — SIGNIFICANT CHANGE UP (ref 0–2)
BILIRUB SERPL-MCNC: 0.4 MG/DL — SIGNIFICANT CHANGE UP (ref 0.4–2)
BUN SERPL-MCNC: 13 MG/DL — SIGNIFICANT CHANGE UP (ref 8–20)
CALCIUM SERPL-MCNC: 9 MG/DL — SIGNIFICANT CHANGE UP (ref 8.6–10.2)
CHLORIDE SERPL-SCNC: 104 MMOL/L — SIGNIFICANT CHANGE UP (ref 98–107)
CK SERPL-CCNC: 136 U/L — SIGNIFICANT CHANGE UP (ref 30–200)
CO2 SERPL-SCNC: 23 MMOL/L — SIGNIFICANT CHANGE UP (ref 22–29)
CREAT SERPL-MCNC: 0.96 MG/DL — SIGNIFICANT CHANGE UP (ref 0.5–1.3)
EOSINOPHIL # BLD AUTO: 0.24 K/UL — SIGNIFICANT CHANGE UP (ref 0–0.5)
EOSINOPHIL NFR BLD AUTO: 4 % — SIGNIFICANT CHANGE UP (ref 0–6)
GLUCOSE SERPL-MCNC: 130 MG/DL — HIGH (ref 70–99)
HCT VFR BLD CALC: 39.3 % — SIGNIFICANT CHANGE UP (ref 39–50)
HGB BLD-MCNC: 13.2 G/DL — SIGNIFICANT CHANGE UP (ref 13–17)
IMM GRANULOCYTES NFR BLD AUTO: 0.2 % — SIGNIFICANT CHANGE UP (ref 0–1.5)
LYMPHOCYTES # BLD AUTO: 1.24 K/UL — SIGNIFICANT CHANGE UP (ref 1–3.3)
LYMPHOCYTES # BLD AUTO: 20.7 % — SIGNIFICANT CHANGE UP (ref 13–44)
MCHC RBC-ENTMCNC: 28.3 PG — SIGNIFICANT CHANGE UP (ref 27–34)
MCHC RBC-ENTMCNC: 33.6 GM/DL — SIGNIFICANT CHANGE UP (ref 32–36)
MCV RBC AUTO: 84.3 FL — SIGNIFICANT CHANGE UP (ref 80–100)
MONOCYTES # BLD AUTO: 0.42 K/UL — SIGNIFICANT CHANGE UP (ref 0–0.9)
MONOCYTES NFR BLD AUTO: 7 % — SIGNIFICANT CHANGE UP (ref 2–14)
NEUTROPHILS # BLD AUTO: 4.05 K/UL — SIGNIFICANT CHANGE UP (ref 1.8–7.4)
NEUTROPHILS NFR BLD AUTO: 67.8 % — SIGNIFICANT CHANGE UP (ref 43–77)
PLATELET # BLD AUTO: 235 K/UL — SIGNIFICANT CHANGE UP (ref 150–400)
POTASSIUM SERPL-MCNC: 4.4 MMOL/L — SIGNIFICANT CHANGE UP (ref 3.5–5.3)
POTASSIUM SERPL-SCNC: 4.4 MMOL/L — SIGNIFICANT CHANGE UP (ref 3.5–5.3)
PROT SERPL-MCNC: 7.1 G/DL — SIGNIFICANT CHANGE UP (ref 6.6–8.7)
RBC # BLD: 4.66 M/UL — SIGNIFICANT CHANGE UP (ref 4.2–5.8)
RBC # FLD: 12.8 % — SIGNIFICANT CHANGE UP (ref 10.3–14.5)
SODIUM SERPL-SCNC: 136 MMOL/L — SIGNIFICANT CHANGE UP (ref 135–145)
TROPONIN T SERPL-MCNC: <0.01 NG/ML — SIGNIFICANT CHANGE UP (ref 0–0.06)
TROPONIN T SERPL-MCNC: <0.01 NG/ML — SIGNIFICANT CHANGE UP (ref 0–0.06)
WBC # BLD: 5.98 K/UL — SIGNIFICANT CHANGE UP (ref 3.8–10.5)
WBC # FLD AUTO: 5.98 K/UL — SIGNIFICANT CHANGE UP (ref 3.8–10.5)

## 2020-12-27 PROCEDURE — 85025 COMPLETE CBC W/AUTO DIFF WBC: CPT

## 2020-12-27 PROCEDURE — 80053 COMPREHEN METABOLIC PANEL: CPT

## 2020-12-27 PROCEDURE — 36415 COLL VENOUS BLD VENIPUNCTURE: CPT

## 2020-12-27 PROCEDURE — 82550 ASSAY OF CK (CPK): CPT

## 2020-12-27 PROCEDURE — 93005 ELECTROCARDIOGRAM TRACING: CPT

## 2020-12-27 PROCEDURE — 99283 EMERGENCY DEPT VISIT LOW MDM: CPT

## 2020-12-27 PROCEDURE — 93010 ELECTROCARDIOGRAM REPORT: CPT

## 2020-12-27 PROCEDURE — 71045 X-RAY EXAM CHEST 1 VIEW: CPT

## 2020-12-27 PROCEDURE — 99285 EMERGENCY DEPT VISIT HI MDM: CPT

## 2020-12-27 PROCEDURE — 84484 ASSAY OF TROPONIN QUANT: CPT

## 2020-12-27 PROCEDURE — 71045 X-RAY EXAM CHEST 1 VIEW: CPT | Mod: 26

## 2020-12-27 NOTE — ED ADULT NURSE NOTE - NSIMPLEMENTINTERV_GEN_ALL_ED
Implemented All Universal Safety Interventions:  [place] to call system. Call bell, personal items and telephone within reach. Instruct patient to call for assistance. Room bathroom lighting operational. Non-slip footwear when patient is off stretcher. Physically safe environment: no spills, clutter or unnecessary equipment. Stretcher in lowest position, wheels locked, appropriate side rails in place.

## 2020-12-27 NOTE — ED PROVIDER NOTE - OBJECTIVE STATEMENT
Pt is a 46 yo M co L chest pain. Pt states that this morning he woke up with L-sided chest pain that radiates to the L shoulder. Pt states that the pain is mild and worse with movement and when he touches the area. no sob. no n/v. no fever/chills. no cough. no other complaints.

## 2020-12-27 NOTE — ED ADULT NURSE NOTE - OBJECTIVE STATEMENT
pt AOX3, even and unlabored resps present, skin warm dry and intact, pt reporting chest pressure and pain down his left arm ater working out yesterday, states he still has residual chest pain. pt reports had appt scheduled with SS cards for next week, had similar episode in the past. pt with no SOB, no fevers, no other complaints, no extremity edema noted.

## 2020-12-27 NOTE — ED PROVIDER NOTE - CLINICAL SUMMARY MEDICAL DECISION MAKING FREE TEXT BOX
labs and imaging reviewed.  Pt with atypical muscular chest pain. 2 trops negative. Pt instructed to f/up with DR. Amado. instructed to return for worsening pain, sob, or any other concerns.  Pt given a copy of all results and instructed to f/up with pcp regarding any abnormal results.

## 2020-12-27 NOTE — ED PROVIDER NOTE - PHYSICAL EXAMINATION
Constitutional - well-developed; well nourished. Head - NCAT. Airway patent. Eyes - PERRL. CV - RRR. no murmur. no edema. L parasternal chest ttp that reproduces his pain. Pulm - CTAB. Abd - soft, nt. no rebound. no guarding. Neuro - A&Ox3. strength 5/5 x4. sensation intact x4. normal gait. Skin - No rash. MSK - normal ROM.

## 2020-12-27 NOTE — ED ADULT TRIAGE NOTE - CHIEF COMPLAINT QUOTE
Pt with chest pain that radiates into left arm x's 30mins. Pain reports that pain is worse with breathing

## 2020-12-27 NOTE — ED PROVIDER NOTE - NS ED ROS FT
No fever/chills, No photophobia/eye pain/changes in vision, No ear pain/sore throat/dysphagia, +chest pain no palpitations, no SOB/cough/wheeze/stridor, No abdominal pain, No N/V/D, no dysuria/frequency/discharge, No neck/back pain, no rash, no changes in neurological status/function.

## 2020-12-27 NOTE — ED PROVIDER NOTE - CARE PROVIDER_API CALL
Danial Griffin  CARDIOVASCULAR DISEASE  [address] [facility], [place] [address]  Phone: [phone]  Fax: [phone]  Follow Up Time: 1-3 Days

## 2020-12-27 NOTE — ED PROVIDER NOTE - PATIENT PORTAL LINK FT
You can access the FollowMyHealth Patient Portal offered by [facility] by registering at the following website: [email or web address]. By joining [facility]’s FollowMyHealth portal, you will also be able to view your health information using other applications (apps) compatible with our system.

## 2021-01-14 NOTE — HISTORY OF PRESENT ILLNESS
[FreeTextEntry1] : PCP: Dr Erica Stewart\par \par INTERIM HISTORY: \par \par 12/11/20: Mr. Andrews is a 45 year-old RH man with PMH of HLD that presents to the office today for a post hospitalization evaluation. He presented to the ED at [facility] on 12/1/20 with c/o acute onset left sided numbness. Patient had been driving two hours prior to admission when felt sudden onset of left sided diffuse numbness including facial, UE and LE with associated symptoms of difficulty moving arms and an associated heaviness feeling. In the ED he had additional c/o nausea for past few days. tPA was administered and patient was transferred to the ICU. His symptoms resolved and he was discharged on 12/3/20 and instructed to follow-up with cardiology for MARY and possible ILR placement. He has been feeling well since discharge but notes new blurry vision and difficulty driving at night. He report feeling anxious and depressed at times, short term memory loss and difficulty with concentration. He works as a  and does not forget where he's going or get lost.  He denies headache, unilateral weakness or decreased sensation, difficulty walking or problems with coordination or falls.  \par \par Hospital Work-Up\par MRI head w/o: No acute intracranial hemorrhage or acute infarct. A chronic infarct in the left cerebellar hemisphere and a chronic lacunar infarct located along the right subinsular region and more posterior aspect of the right lentiform nucleus.\par CTP: No core infarct or penumbra of ischemic tissues identified by CT technique.  \par CTA neck: The cervical vasculature is patent without evidence of dissection.  There is calcified and noncalcified plaque at the left carotid bifurcation.  There is no hemodynamically significant carotid stenosis or flow-limiting vertebral artery stenosis. Approximately 2 cm right thyroid nodule.  If this has not been previously characterized, a nonemergent thyroid ultrasound should be obtained as an outpatient. Mild enlargement adenoids.  Nonspecific 7 mm cystic focus in the right palatine tonsil.\par CTA brain: No vessel occlusion, flow-limiting stenosis or aneurysm is identified about the [place].  MRI is more sensitive for acute ischemia.  Follow-up MRI is recommended for further evaluation.\par TTE: EF 65-70%, No cardiac mass, vegetations, thrombus or shunts visualized. \par Hgb A1C 6.5%, TG 98, HDL 40, LDL 70

## 2021-01-14 NOTE — CONSULT LETTER
[Dear  ___] : Dear  [unfilled], [Sincerely,] : Sincerely, [FreeTextEntry1] : \par I had the pleasure of seeing your patient, ORIANA GOMEZ, in my office today. Please see my note below. \par \par If you have any questions, please do not hesitate to contact me.\par \par \par \par \par \par \par \par \par \par \par   [FreeTextEntry3] : Noelle Dougherty NP\par [facility] [place]\par [address] [place] [address]\par Phone: [phone]; Fax: [phone]

## 2021-01-15 ENCOUNTER — NON-APPOINTMENT (OUTPATIENT)
Age: 46
End: 2021-01-15

## 2021-01-15 ENCOUNTER — EMERGENCY (EMERGENCY)
Facility: HOSPITAL | Age: 46
LOS: 1 days | Discharge: HOME | End: 2021-01-15
Payer: MEDICAID

## 2021-01-15 ENCOUNTER — APPOINTMENT (OUTPATIENT)
Dept: NEUROLOGY | Facility: CLINIC | Age: 46
End: 2021-01-15

## 2021-01-15 VITALS
HEIGHT: 67 IN | TEMPERATURE: 99 F | OXYGEN SATURATION: 95 % | SYSTOLIC BLOOD PRESSURE: 170 MMHG | DIASTOLIC BLOOD PRESSURE: 90 MMHG | RESPIRATION RATE: 17 BRPM | HEART RATE: 68 BPM

## 2021-01-15 PROCEDURE — 93005 ELECTROCARDIOGRAM TRACING: CPT

## 2021-01-15 PROCEDURE — 93010 ELECTROCARDIOGRAM REPORT: CPT

## 2021-01-15 PROCEDURE — 99284 EMERGENCY DEPT VISIT MOD MDM: CPT

## 2021-01-15 PROCEDURE — 99283 EMERGENCY DEPT VISIT LOW MDM: CPT

## 2021-01-15 NOTE — ED ADULT TRIAGE NOTE - NS ED NURSE DIRECT TO ROOM YN
Benefits, risks, and possible complications of procedure explained to patient/caregiver who verbalized understanding and gave verbal consent. Yes

## 2021-01-15 NOTE — ED STATDOCS - PROGRESS NOTE DETAILS
46 y/o male with PMHx of non obstructive CAD with CTA that showed moderate CAD, HLD, TIA, now with daily chest pain and L arm pain. Focused eval, protocol orders entered. Pt to be moved to main ED for complete evaluation by another provider. 44 y/o male with PMHx of non obstructive CAD with CTA that showed moderate CAD, HLD, stroke, now with daily chest pain and L arm pain. Focused eval, protocol orders entered. Pt to be moved to main ED for complete evaluation by another provider.

## 2021-01-15 NOTE — ED ADULT TRIAGE NOTE - CHIEF COMPLAINT QUOTE
constant left chest pressure  x 1 hr pta ; hx TIA  & angina . ekg done. denies numbness/tingling/radiation

## 2021-01-20 DIAGNOSIS — J30.2 OTHER SEASONAL ALLERGIC RHINITIS: ICD-10-CM

## 2021-01-20 RX ORDER — CETIRIZINE HYDROCHLORIDE 10 MG/1
10 TABLET, COATED ORAL
Refills: 0 | Status: ACTIVE | COMMUNITY
Start: 2021-01-20

## 2021-01-20 RX ORDER — ASPIRIN 81 MG
81 TABLET, DELAYED RELEASE (ENTERIC COATED) ORAL DAILY
Refills: 0 | Status: ACTIVE | COMMUNITY

## 2021-01-20 RX ORDER — FLUTICASONE PROPIONATE 50 UG/1
50 SPRAY, METERED NASAL DAILY
Refills: 0 | Status: ACTIVE | COMMUNITY
Start: 2021-01-20

## 2021-02-08 ENCOUNTER — APPOINTMENT (OUTPATIENT)
Dept: CARDIOLOGY | Facility: CLINIC | Age: 46
End: 2021-02-08

## 2021-05-03 NOTE — ED STATDOCS - PROGRESS NOTE DETAILS
43 y/o M pt with no significant PMHx presents to ED c/o bilateral foot pain s/p jumping off a shaky ladder earlier this morning. HPI/ ROS/ PEx as stated above. xray with b/l calcaneal fx. Pt with pain more on L than R. Will place L foot in ortho shoe and give crutches. Pt to f/u with orthopedics. xray with no fx. Pt with pain more on L than R. Will place L foot in ortho shoe and give crutches. Pt to f/u with Podiatry. Spiral Flap Text: The defect edges were debeveled with a #15 scalpel blade.  Given the location of the defect, shape of the defect and the proximity to free margins a spiral flap was deemed most appropriate.  Using a sterile surgical marker, an appropriate rotation flap was drawn incorporating the defect and placing the expected incisions within the relaxed skin tension lines where possible. The area thus outlined was incised deep to adipose tissue with a #15 scalpel blade.  The skin margins were undermined to an appropriate distance in all directions utilizing iris scissors.

## 2021-06-07 NOTE — ED PROVIDER NOTE - TOBACCO USE
Call Dr. Brown this morning for a follow-up appointment within 1 week.  Elevate the left wrist on a pillow above the heart.  Ice the leftwrist for 20 minutes every 2 hours while you are awake for 2 to 3 days.  Take Colace as needed as directed for constipation if you are taking the Norco for pain.  Take the Norco as prescribed as needed for pain.  Return to the emergency department if there is increased pain, numbness, tingling, weakness, change in color or temperature, worse in any way at all.   Unknown if ever smoked

## 2021-08-23 NOTE — ED PROVIDER NOTE - HEME/LYMPH NEGATIVE STATEMENT, MLM
Oxytocin Safety Progress Check Note - Amanda Shirts 25 y o  female MRN: [id number]    Unit/Bed#: -01 Encounter: [id number]    Dose (hoa-units/min) Oxytocin: 12 hoa-units/min  Contraction Frequency (minutes): 2-4  Contraction Quality: Mild  Tachysystole: No   Cervical Dilation: 4        Cervical Effacement: 40  Fetal Station: -3  Baseline Rate: 125 bpm  Fetal Heart Rate: 132 BPM  FHR Category: Category I               Vital Signs:   Vitals:    08/23/21 0100   BP: 92/50   Pulse: 85   Resp:    Temp:    SpO2:            Notes/comments: SVE deferred, patient sleeping, strip cat 1       Berneta Leyden, MD 8/23/2021 1:44 AM no anemia, no easy bruising, no jaundice, no swollen lymph nodes.

## 2021-08-24 NOTE — ED PROVIDER NOTE - ENMT NEGATIVE STATEMENT, MLM
112 Ears: no ear pain and no hearing problems.Nose: no nasal congestion and no nasal drainage.Mouth/Throat: no dysphagia, no hoarseness and no throat pain.Neck: no lumps, no pain, no stiffness and no swollen glands.

## 2022-02-11 NOTE — ED ADULT NURSE NOTE - AS SC BRADEN MOBILITY
[FreeTextEntry1] : The patient presents today for evaluation, fitting and dispensing of repaired orthotics\par Location-[facility]\par Duration- a few months but worsening\par Past tx-ultravate\par 
(4) no limitation

## 2022-12-16 NOTE — CONSULT NOTE ADULT - REASON FOR ADMISSION
Patient : Heather Holcomb Age: 39 year old Sex: male   MRN: [id number] Encounter Date: 12/16/2022    History     Chief Complaint   Patient presents with   • Suicidal       HPI    Heather Holcomb is a 39 year old presenting to the emergency department for medical clearance and suicidal ideation.  Patient was sent from [facility] for medical clearance prior to care beyond the ED on 4 .  Patient was seen evaluated by intake at [facility] for suicidal ideation.  Patient admits to being suicidal.  States he otherwise feels well and denies any complaints including fevers, chills, cough, shortness of breath, chest pain, abdominal pain, nausea/vomiting/diarrhea, urinary symptoms, or other complaints.  No modifying factors noted.    I have reviewed Heather Holcomb's previous Emergency department visit at [facility] on 12/13/2022.  Note Review Summary:  Patient was seen at [facility] for homelessness asked for place to sleep.  He also had complaint of chronic pain but was not noted to have a complaints of SI or psychiatric issues at that time.  Patient was seen and evaluated at [facility] prior to arrival here [facility].  He was evaluated by intake and staffed with Dr. Braga who has accepted patient for care beyond the ED to 4  pending medical clearance.    Allergies   Allergen Reactions   • Seasonal Other (See Comments)     Stuffiness, watery eyes       No current facility-administered medications for this encounter.     Current Outpatient Medications   Medication Sig   • risperiDONE (RisperDAL) 1 MG tablet Take 1 tablet by mouth every 12 hours.       Past Medical History:   Diagnosis Date   • Anxiety    • Arthritis    • Chronic pain    • Depression    • Schizoaffective disorder    • Seizures (CMS/[facility]) 2007    ONE TIME       History reviewed. No pertinent surgical history.    History reviewed. No pertinent family history.    Social History     Tobacco Use   • Smoking status: Some Days      Packs/day: 0.25     Types: Cigarettes   • Smokeless tobacco: Never   • Tobacco comments:     2-3 cigs a week   Vaping Use   • Vaping Use: never used   Substance Use Topics   • Alcohol use: Not Currently     Alcohol/week: 4.0 standard drinks     Types: 4 Cans of beer per week     Comment: States he may drink \"a couple of times per week, and when he does he may have a couple of beers.   • Drug use: Not Currently     Types: Marijuana, Cocaine     Comment: HX THC USE, Denies remembering when he used either last.       Review of Systems     Review of Systems   Constitutional: Negative for chills and fever.   HENT: Negative for congestion, rhinorrhea and sore throat.    Eyes: Negative for visual disturbance.   Respiratory: Negative for cough and shortness of breath.    Cardiovascular: Negative for chest pain.   Gastrointestinal: Negative for abdominal pain, constipation, diarrhea, nausea and vomiting.   Genitourinary: Negative for difficulty urinating, dysuria, frequency and hematuria.   Musculoskeletal: Negative for arthralgias.   Skin: Negative for rash.   Neurological: Negative for weakness, numbness and headaches.   Hematological: Does not bruise/bleed easily.   Psychiatric/Behavioral: Positive for suicidal ideas.   All other systems reviewed and are negative.      Physical Exam     ED Triage Vitals [12/16/22 0215]   ED Triage Vitals Group      Temp 98 °F (36.7 °C)      Heart Rate 70      Resp 16      /66      SpO2 100 %      EtCO2 mmHg       Height       Weight 180 lb 8.9 oz (81.9 kg)      Weight Scale Used Scale in bed      BMI (Calculated)       IBW/kg (Calculated)        Physical Exam  Vitals and nursing note reviewed.   Constitutional:       General: He is not in acute distress.     Appearance: He is well-developed. He is not diaphoretic.   HENT:      Head: Normocephalic and atraumatic.      Nose: Nose normal.   Eyes:      General: No scleral icterus.     Conjunctiva/sclera: Conjunctivae normal.       Pupils: Pupils are equal, round, and reactive to light.   Neck:      Vascular: No JVD.   Cardiovascular:      Rate and Rhythm: Normal rate and regular rhythm.      Heart sounds: Normal heart sounds. No murmur heard.    No friction rub. No gallop.   Pulmonary:      Effort: Pulmonary effort is normal. No respiratory distress.      Breath sounds: Normal breath sounds. No stridor. No wheezing or rales.   Chest:      Chest wall: No tenderness.   Abdominal:      General: Bowel sounds are normal.      Palpations: Abdomen is soft. There is no mass.      Tenderness: There is no abdominal tenderness. There is no guarding or rebound.   Musculoskeletal:         General: No tenderness. Normal range of motion.      Cervical back: Normal range of motion and neck supple.   Lymphadenopathy:      Cervical: No cervical adenopathy.   Skin:     General: Skin is warm and dry.      Findings: No erythema or rash.   Neurological:      Mental Status: He is alert and oriented to person, place, and time.      Cranial Nerves: No cranial nerve deficit.      Motor: No abnormal muscle tone.      Coordination: Coordination normal.   Psychiatric:         Mood and Affect: Affect is flat.         Thought Content: Thought content includes suicidal ideation.         Judgment: Judgment normal.           Procedures     Procedures    Lab Results     Results for orders placed or performed during the hospital encounter of 12/16/22   Comprehensive Metabolic Panel   Result Value Ref Range    Fasting Status      Sodium 143 135 - 145 mmol/L    Potassium 3.6 3.4 - 5.1 mmol/L    Chloride 107 97 - 110 mmol/L    Carbon Dioxide 31 21 - 32 mmol/L    Anion Gap 9 7 - 19 mmol/L    Glucose 93 70 - 99 mg/dL    BUN 9 6 - 20 mg/dL    Creatinine 0.96 0.67 - 1.17 mg/dL    Glomerular Filtration Rate >90 >=60    BUN/ Creatinine Ratio 9 7 - 25    Calcium 8.5 8.4 - 10.2 mg/dL    Bilirubin, Total 0.2 0.2 - 1.0 mg/dL    GOT/AST 17 <=37 Units/L    GPT/ALT 21 <64 Units/L    Alkaline  Phosphatase 76 45 - 117 Units/L    Albumin 3.0 (L) 3.6 - 5.1 g/dL    Protein, Total 7.0 6.4 - 8.2 g/dL    Globulin 4.0 2.0 - 4.0 g/dL    A/G Ratio 0.8 (L) 1.0 - 2.4   Acetaminophen Level   Result Value Ref Range    Acetaminophen <2 (L) 10 - 30 mcg/mL   Salicylate Level   Result Value Ref Range    Salicylate <2.8 <=30.0 mg/dL   Alcohol   Result Value Ref Range    Alcohol None Detected None Detected mg/dL   CBC with Automated Differential (performable only)   Result Value Ref Range    WBC 8.0 4.2 - 11.0 K/mcL    RBC 4.29 (L) 4.50 - 5.90 mil/mcL    HGB 13.5 13.0 - 17.0 g/dL    HCT 40.4 39.0 - 51.0 %    MCV 94.2 78.0 - 100.0 fl    MCH 31.5 26.0 - 34.0 pg    MCHC 33.4 32.0 - 36.5 g/dL    RDW-CV 11.9 11.0 - 15.0 %    RDW-SD 40.9 39.0 - 50.0 fL     140 - 450 K/mcL    NRBC 0 <=0 /100 WBC    Neutrophil, Percent 48 %    Lymphocytes, Percent 39 %    Mono, Percent 11 %    Eosinophils, Percent 2 %    Basophils, Percent 0 %    Immature Granulocytes 0 %    Absolute Neutrophils 3.9 1.8 - 7.7 K/mcL    Absolute Lymphocytes 3.1 1.0 - 4.8 K/mcL    Absolute Monocytes 0.8 0.3 - 0.9 K/mcL    Absolute Eosinophils  0.1 0.0 - 0.5 K/mcL    Absolute Basophils 0.0 0.0 - 0.3 K/mcL    Absolute Immmature Granulocytes 0.0 0.0 - 0.2 K/mcL   Rapid SARS-CoV-2 by PCR    Specimen: Nasal, Mid-turbinate; Swab   Result Value Ref Range    Rapid SARS-COV-2 by PCR Not Detected Not Detected / Detected / Presumptive Positive / Inhibitors present    Isolation Guidelines      Procedural Comment         EKG     No EKG performed    Radiology Results     Imaging Results    None         ED Medications     Medications - No data to display      ED Course     Vitals:    12/16/22 0215   BP: 115/66   BP Location: RUE - Right upper extremity   Patient Position: Supine   Pulse: 70   Resp: 16   Temp: 98 °F (36.7 °C)   TempSrc: Oral   SpO2: 100%   Weight: 81.9 kg (180 lb 8.9 oz)            No cardiac monitor or imaging reviewed        Consults                    MDM                         Patient is a 39 year old with complaint of suicidal ideation.  My differential diagnosis includes but is not limited to depression, bipolar, schizoaffective disorder, suicidal ideation, overdose, substance abuse, alcohol abuse. The patient will require admission.  Patient will require admission for further care beyond the ED for treatment of his psychiatric issues and suicidal ideation.    Does the Patient have sepsis: NO     Critical Care       No Critical Care        Disposition       Clinical Impression and Diagnosis  2:19 AM     Diagnosis:   1. Suicidal ideation           Pt to be admitted to Dr. Piper     Condition on Admission: Stable              There is no disposition no dispo time  There is no comment  Vitals  Vitals:    12/16/22 0215   BP: 115/66   BP Location: RUE - Right upper extremity   Patient Position: Supine   Pulse: 70   Resp: 16   Temp: 98 °F (36.7 °C)   TempSrc: Oral   SpO2: 100%   Weight: 81.9 kg (180 lb 8.9 oz)           ____________________________________________________________________      Dr. Anil Jose MD  Dictation # [id number]             Anil Jose MD  12/16/22 0525     Acute CVA symptoms of left side numbness tingling and LLE weakness 4/5

## 2024-01-31 NOTE — ED ADULT TRIAGE NOTE - LOCATION:
Occupational Therapy    Visit Type: initial evaluation  SUBJECTIVE  Patient unable to verbalize agreement to participate in therapy session.  Patient verbally agrees to allow the following to be present during session: spouse, daughter and son  \"Read that sign to me over there\"  Patient / Family Goal: unable to state    Pain     Location: L arm    Pain severity now: unable to state.    OBJECTIVE     Cognitive Status   Orientation    - Oriented to: person   - Disoriented to: place, time and situation  Functional Communication   - Overall Status: impaired   - Forms of Communication: incomprehensible, garbled and expressive deficits  Attention Span    - Attention: impaired   - Attention impairment: reduced memory, tangential behaviors, perseveration, impulsivity, internal factors, distractibility, external factors and fatigue  Following Direction   - does not follow commands  Transition Between Tasks   - unable to transition  Executive Function    - Organization: impaired   - Sequencing: impaired   - Problem Solving: impaired   - Termination: impaired   - Initiation: impaired   - Time Management: impaired   - Planning: impaired   - Execution: impaired  Safety Awareness/Insight   - impaired  Awareness of Deficits   - not aware of deficits  Verbal Expression   - impaired    Patient Activity Tolerance: 2 to 1 activity to rest    Hand Dominance: right-handed      Range of Motion (ROM)   (degrees unless noted; active unless noted; norms in ( ); negative=lacking to 0, positive=beyond 0)  WFL: RUE, left wrist, left hand/fingers    Strength  (out of 5 unless noted, standard test position unless noted)   WFL: RUE, left hand/fingers  Gross :  strength grossly equal bilateral      Sitting Balance  (DANIE = base of support)  Static      - Trial 1 details: with double LE support, with single UE support and supervision  Dynamic      - Trial 1 details: with double LE support, with single UE support and contact guard    Standing  Balance  (DANIE = base of support)  Firm Surface: Double Leg      - Static, Eyes Open       - Trial 1 details: minimal assist, with verbal cues and with single UE support       Bed Mobility  - Repositioning in bed: minimal assist  - Sit to supine: with verbal cues, contact guard/touching/steadying assist (max verbal cueing for L arm placement)  Transfers  Assistive devices: hand hold, gait belt, 1 person      Activities of Daily Living (ADLs)  Eating:   - Assist: maximal assist and with verbal cues  Toileting:   - Assist: patient refused  Patient and daughter in law refusing toileting as she went earlier this AM. Family understanding of education on positioning with LUE utilizing pillows for comfort.     Vision  Current Vision: no visual deficits      Interventions    Training provided: activity tolerance, ADL training, balance retraining, bed mobility training, cognitive training, breathing/relaxation, body mechanics, energy conservation, postural re-education, positioning and safety training  Skilled input: tactile instruction/cues and verbal instruction/cues  Verbal Consent: Writer verbally educated and received verbal consent for hand placement, positioning of patient, and techniques to be performed today from patient for clothing adjustments for techniques and therapist position for techniques as described above and how they are pertinent to the patient's plan of care.         Education:   - Present and ready to learn: patient, patient's family and patient's significant other  Education provided during session:  - Results of above outlined education: No evidence of learning    ASSESSMENT   Patient will benefit from inpatient skilled therapy to address current assessed functional limitations and impairments.  Interferring components: cognitive deficits, decreased activity tolerance, decreased insight into deficit, medical status limitations and weight bearing status    Discharge needs based on today's  assessment:  - Current level of function: slightly below baseline level of function  Ot therapy dc needs: prison care.  - Activities of daily living (ADLs) requiring support at discharge: bed mobility, transfers, ambulation, feeding, dressing, grooming, toileting, continence and bathing  - Instrumental activities of daily living (IADLs) requiring support at discharge: emergency responses  - Impairments that require further therapy intervention: pain, ROM, strength, activity tolerance, cognition, safety awareness, balance and executive functioning  AM-PAC  - Prior Level of Function: Needs > MOD A (AMPAC <12)       Key: MOD A=moderate assistance, IND/MOD I=independent/modified independent  - Generalized Current Level of Function     - Current Self-Cares: 8       Scoring Key= >21 Modified Independent; 20-21 Supervision; 18-19 Minimal assist; 13-17 Moderate assist; 9-12 Max assist; <9 Total assist    AM-PAC Cognition Screen:  Cognition Score: 6/24            Personal Occupations Profile Affected: bathing/showering, functional mobility/transfers, toileting/toilet hygiene, personal hygiene/grooming, feeding, lower body dressing, upper body dressing, safety/emergency maintenance      Clinical decision making: Low - Patient has few limitations (1-3), comorbidities and/or complexities, as noted in problem focused assessment noted above, that impact their occupational profile.  Resulting in few treatment options and no task modification consistent with low clinical decision making complexity.    PLAN (while hospitalized)  Suggestions for next session as indicated:   OT Frequency: 3-5 x per week         Interventions: ADL retraining, functional transfer training, activity tolerance training, patient/family training, neuromuscular reeducation, compensatory technique education, balance, behavioral modification, energy conservation, positioning, safety training, transfer training, patient education, coordination, body  mechanics, community integration, IADL, therapeutic activity, therapeutic exercise, sensory reintegration, gait training, compensatory techniques, caregiver training, bed mobility training, continued evaluation, fine motor coordination activities, cognitive retraining and upper extremity strengthening/ROM  Agreement to plan and goals: patient unable to agree with goals and treatment plan      GOALS  Long Term Goals: (to be met by time of discharge from hospital)  Maintain precautions: Patient able to maintain precautions moderate cues and minimal assist.  Lower body dressing: Patient will complete lower body dressing in sitting with minimal cues and moderate assist.  Toileting: Patient will complete toileting minimal assist.  Sit (edge of bed): Patient will sit at edge of bed for modified independent and with minimal cues.   Toilet transfer: Patient will complete toilet transfer with 2-wheeled walker and gait belt, commode over toilet, minimal assist.     Documented in the chart in the following areas: Prior Level of Function. Assessment/Plan.    Patient at End of Session:   Location: in bed  Safety measures: alarm system in place/re-engaged, bed rails x2 and call light within reach  Handoff to: family/caregiver      I was in the immediate presence of the student and directed the student’s performance of the services. I am responsible for all treatment, assessment, documentation, and billing rendered for this patient.   Maude Romero, OT        Therapy procedure time and total treatment time can be found documented on the Time Entry flowsheet   Right arm;

## 2024-02-27 NOTE — PHYSICAL THERAPY INITIAL EVALUATION ADULT - NEUROVASCULAR ASSESSMENT LUE
Medical Necessity Information: It is in your best interest to select a reason for this procedure from the list below. All of these items fulfill various CMS LCD requirements except lesion extends to a margin. Include Z78.9 (Other Specified Conditions Influencing Health Status) As An Associated Diagnosis?: No Medical Necessity Clause: This procedure was medically necessary because the lesion that was treated was: Lab: 6 Lab Facility: 3 Surgeon (Optional): Dr. Soren Stiles Size Of Lesion In Cm: 1.6 X Size Of Lesion In Cm (Optional): 1.5 Size Of Margin In Cm: 0.4 Eye Clamp Note Details: An eye clamp was used during the procedure. Excision Method: Round Saucerization Depth: dermis and superficial adipose tissue Repair Type: Intermediate Suturegard Retention Suture: 2-0 Nylon Retention Suture Bite Size: 3 mm Length To Time In Minutes Device Was In Place: 10 Number Of Hemigard Strips Per Side: 1 Undermining Type: Entire Wound Debridement Text: The wound edges were debrided prior to proceeding with the closure to facilitate wound healing. Helical Rim Text: The closure involved the helical rim. Vermilion Border Text: The closure involved the vermilion border. Nostril Rim Text: The closure involved the nostril rim. Retention Suture Text: Retention sutures were placed to support the closure and prevent dehiscence. Primary Defect Length (In Cm): 0 Suture Removal: 10 days Epidermal Closure Graft Donor Site (Optional): simple interrupted Detail Level: Detailed Excision Depth: dermis Scalpel Size: 15 blade Anesthesia Type: 1% lidocaine without epinephrine Hemostasis: Electrocautery Estimated Blood Loss (Cc): minimal Anesthesia Type: 1% lidocaine with 1:100,000 epinephrine Epidermal Sutures: 5-0 Silk Epidermal Closure: running no numbness/no tingling Wound Care: Vaseline Dressing: pressure dressing Suturegard Intro: Intraoperative tissue expansion was performed, utilizing the SUTUREGARD device, in order to reduce wound tension. Suturegard Body: The suture ends were repeatedly re-tightened and re-clamped to achieve the desired tissue expansion. Hemigard Intro: Due to skin fragility and wound tension, it was decided to use HEMIGARD adhesive retention suture devices to permit a linear closure. The skin was cleaned and dried for a 6cm distance away from the wound. Excessive hair, if present, was removed to allow for adhesion. Hemigard Postcare Instructions: The HEMIGARD strips are to remain completely dry for at least 5-7 days. Positioning (Leave Blank If You Do Not Want): The patient was placed in a comfortable position exposing the surgical site. Complex Repair Preamble Text (Leave Blank If You Do Not Want): Extensive wide undermining was performed. Intermediate Repair Preamble Text (Leave Blank If You Do Not Want): Undermining was performed with blunt dissection. Fusiform Excision Additional Text (Leave Blank If You Do Not Want): The margin was drawn around the clinically apparent lesion.  A fusiform shape was then drawn on the skin incorporating the lesion and margins.  Incisions were then made along these lines to the appropriate tissue plane and the lesion was extirpated. Eliptical Excision Additional Text (Leave Blank If You Do Not Want): The margin was drawn around the clinically apparent lesion.  An elliptical shape was then drawn on the skin incorporating the lesion and margins.  Incisions were then made along these lines to the appropriate tissue plane and the lesion was extirpated. Saucerization Excision Additional Text (Leave Blank If You Do Not Want): The margin was drawn around the clinically apparent lesion.  Incisions were then made along these lines, in a tangential fashion, to the appropriate tissue plane and the lesion was extirpated. Slit Excision Additional Text (Leave Blank If You Do Not Want): A linear line was drawn on the skin overlying the lesion. An incision was made slowly until the lesion was visualized.  Once visualized, the lesion was removed with blunt dissection. Excisional Biopsy Additional Text (Leave Blank If You Do Not Want): The margin was drawn around the clinically apparent lesion. An elliptical shape was then drawn on the skin incorporating the lesion and margins.  Incisions were then made along these lines to the appropriate tissue plane and the lesion was extirpated. Perilesional Excision Additional Text (Leave Blank If You Do Not Want): The margin was drawn around the clinically apparent lesion. Incisions were then made along these lines to the appropriate tissue plane and the lesion was extirpated. Repair Performed By Another Provider Text (Leave Blank If You Do Not Want): After the tissue was excised the defect was repaired by another provider. No Repair - Repaired With Adjacent Surgical Defect Text (Leave Blank If You Do Not Want): After the excision the defect was repaired concurrently with another surgical defect which was in close approximation. Adjacent Tissue Transfer Text: The defect edges were debeveled with a #15 scalpel blade.  Given the location of the defect and the proximity to free margins an adjacent tissue transfer was deemed most appropriate.  Using a sterile surgical marker, an appropriate flap was drawn incorporating the defect and placing the expected incisions within the relaxed skin tension lines where possible.    The area thus outlined was incised deep to adipose tissue with a #15 scalpel blade.  The skin margins were undermined to an appropriate distance in all directions utilizing iris scissors. Advancement Flap (Single) Text: The defect edges were debeveled with a #15 scalpel blade.  Given the location of the defect and the proximity to free margins a single advancement flap was deemed most appropriate.  Using a sterile surgical marker, an appropriate advancement flap was drawn incorporating the defect and placing the expected incisions within the relaxed skin tension lines where possible.    The area thus outlined was incised deep to adipose tissue with a #15 scalpel blade.  The skin margins were undermined to an appropriate distance in all directions utilizing iris scissors. Advancement Flap (Double) Text: The defect edges were debeveled with a #15 scalpel blade.  Given the location of the defect and the proximity to free margins a double advancement flap was deemed most appropriate.  Using a sterile surgical marker, the appropriate advancement flaps were drawn incorporating the defect and placing the expected incisions within the relaxed skin tension lines where possible.    The area thus outlined was incised deep to adipose tissue with a #15 scalpel blade.  The skin margins were undermined to an appropriate distance in all directions utilizing iris scissors. Burow's Advancement Flap Text: The defect edges were debeveled with a #15 scalpel blade.  Given the location of the defect and the proximity to free margins a Burow's advancement flap was deemed most appropriate.  Using a sterile surgical marker, the appropriate advancement flap was drawn incorporating the defect and placing the expected incisions within the relaxed skin tension lines where possible.    The area thus outlined was incised deep to adipose tissue with a #15 scalpel blade.  The skin margins were undermined to an appropriate distance in all directions utilizing iris scissors. Chonodrocutaneous Helical Advancement Flap Text: The defect edges were debeveled with a #15 scalpel blade.  Given the location of the defect and the proximity to free margins a chondrocutaneous helical advancement flap was deemed most appropriate.  Using a sterile surgical marker, the appropriate advancement flap was drawn incorporating the defect and placing the expected incisions within the relaxed skin tension lines where possible.    The area thus outlined was incised deep to adipose tissue with a #15 scalpel blade.  The skin margins were undermined to an appropriate distance in all directions utilizing iris scissors. Crescentic Advancement Flap Text: The defect edges were debeveled with a #15 scalpel blade.  Given the location of the defect and the proximity to free margins a crescentic advancement flap was deemed most appropriate.  Using a sterile surgical marker, the appropriate advancement flap was drawn incorporating the defect and placing the expected incisions within the relaxed skin tension lines where possible.    The area thus outlined was incised deep to adipose tissue with a #15 scalpel blade.  The skin margins were undermined to an appropriate distance in all directions utilizing iris scissors. A-T Advancement Flap Text: The defect edges were debeveled with a #15 scalpel blade.  Given the location of the defect, shape of the defect and the proximity to free margins an A-T advancement flap was deemed most appropriate.  Using a sterile surgical marker, an appropriate advancement flap was drawn incorporating the defect and placing the expected incisions within the relaxed skin tension lines where possible.    The area thus outlined was incised deep to adipose tissue with a #15 scalpel blade.  The skin margins were undermined to an appropriate distance in all directions utilizing iris scissors. O-T Advancement Flap Text: The defect edges were debeveled with a #15 scalpel blade.  Given the location of the defect, shape of the defect and the proximity to free margins an O-T advancement flap was deemed most appropriate.  Using a sterile surgical marker, an appropriate advancement flap was drawn incorporating the defect and placing the expected incisions within the relaxed skin tension lines where possible.    The area thus outlined was incised deep to adipose tissue with a #15 scalpel blade.  The skin margins were undermined to an appropriate distance in all directions utilizing iris scissors. O-L Flap Text: The defect edges were debeveled with a #15 scalpel blade.  Given the location of the defect, shape of the defect and the proximity to free margins an O-L flap was deemed most appropriate.  Using a sterile surgical marker, an appropriate advancement flap was drawn incorporating the defect and placing the expected incisions within the relaxed skin tension lines where possible.    The area thus outlined was incised deep to adipose tissue with a #15 scalpel blade.  The skin margins were undermined to an appropriate distance in all directions utilizing iris scissors. O-Z Flap Text: The defect edges were debeveled with a #15 scalpel blade.  Given the location of the defect, shape of the defect and the proximity to free margins an O-Z flap was deemed most appropriate.  Using a sterile surgical marker, an appropriate transposition flap was drawn incorporating the defect and placing the expected incisions within the relaxed skin tension lines where possible. The area thus outlined was incised deep to adipose tissue with a #15 scalpel blade.  The skin margins were undermined to an appropriate distance in all directions utilizing iris scissors. Double O-Z Flap Text: The defect edges were debeveled with a #15 scalpel blade.  Given the location of the defect, shape of the defect and the proximity to free margins a Double O-Z flap was deemed most appropriate.  Using a sterile surgical marker, an appropriate transposition flap was drawn incorporating the defect and placing the expected incisions within the relaxed skin tension lines where possible. The area thus outlined was incised deep to adipose tissue with a #15 scalpel blade.  The skin margins were undermined to an appropriate distance in all directions utilizing iris scissors. V-Y Flap Text: The defect edges were debeveled with a #15 scalpel blade.  Given the location of the defect, shape of the defect and the proximity to free margins a V-Y flap was deemed most appropriate.  Using a sterile surgical marker, an appropriate advancement flap was drawn incorporating the defect and placing the expected incisions within the relaxed skin tension lines where possible.    The area thus outlined was incised deep to adipose tissue with a #15 scalpel blade.  The skin margins were undermined to an appropriate distance in all directions utilizing iris scissors. Advancement-Rotation Flap Text: The defect edges were debeveled with a #15 scalpel blade.  Given the location of the defect, shape of the defect and the proximity to free margins an advancement-rotation flap was deemed most appropriate.  Using a sterile surgical marker, an appropriate flap was drawn incorporating the defect and placing the expected incisions within the relaxed skin tension lines where possible. The area thus outlined was incised deep to adipose tissue with a #15 scalpel blade.  The skin margins were undermined to an appropriate distance in all directions utilizing iris scissors. Mercedes Flap Text: The defect edges were debeveled with a #15 scalpel blade.  Given the location of the defect, shape of the defect and the proximity to free margins a Mercedes flap was deemed most appropriate.  Using a sterile surgical marker, an appropriate advancement flap was drawn incorporating the defect and placing the expected incisions within the relaxed skin tension lines where possible. The area thus outlined was incised deep to adipose tissue with a #15 scalpel blade.  The skin margins were undermined to an appropriate distance in all directions utilizing iris scissors. Modified Advancement Flap Text: The defect edges were debeveled with a #15 scalpel blade.  Given the location of the defect, shape of the defect and the proximity to free margins a modified advancement flap was deemed most appropriate.  Using a sterile surgical marker, an appropriate advancement flap was drawn incorporating the defect and placing the expected incisions within the relaxed skin tension lines where possible.    The area thus outlined was incised deep to adipose tissue with a #15 scalpel blade.  The skin margins were undermined to an appropriate distance in all directions utilizing iris scissors. Mucosal Advancement Flap Text: Given the location of the defect, shape of the defect and the proximity to free margins a mucosal advancement flap was deemed most appropriate. Incisions were made with a 15 blade scalpel in the appropriate fashion along the cutaneous vermilion border and the mucosal lip. The remaining actinically damaged mucosal tissue was excised.  The mucosal advancement flap was then elevated to the gingival sulcus with care taken to preserve the neurovascular structures and advanced into the primary defect. Care was taken to ensure that precise realignment of the vermilion border was achieved. Peng Advancement Flap Text: The defect edges were debeveled with a #15 scalpel blade.  Given the location of the defect, shape of the defect and the proximity to free margins a Peng advancement flap was deemed most appropriate.  Using a sterile surgical marker, an appropriate advancement flap was drawn incorporating the defect and placing the expected incisions within the relaxed skin tension lines where possible. The area thus outlined was incised deep to adipose tissue with a #15 scalpel blade.  The skin margins were undermined to an appropriate distance in all directions utilizing iris scissors. Hatchet Flap Text: The defect edges were debeveled with a #15 scalpel blade.  Given the location of the defect, shape of the defect and the proximity to free margins a hatchet flap was deemed most appropriate.  Using a sterile surgical marker, an appropriate hatchet flap was drawn incorporating the defect and placing the expected incisions within the relaxed skin tension lines where possible.    The area thus outlined was incised deep to adipose tissue with a #15 scalpel blade.  The skin margins were undermined to an appropriate distance in all directions utilizing iris scissors. Rotation Flap Text: The defect edges were debeveled with a #15 scalpel blade.  Given the location of the defect, shape of the defect and the proximity to free margins a rotation flap was deemed most appropriate.  Using a sterile surgical marker, an appropriate rotation flap was drawn incorporating the defect and placing the expected incisions within the relaxed skin tension lines where possible.    The area thus outlined was incised deep to adipose tissue with a #15 scalpel blade.  The skin margins were undermined to an appropriate distance in all directions utilizing iris scissors. Bilateral Rotation Flap Text: The defect edges were debeveled with a #15 scalpel blade. Given the location of the defect, shape of the defect and the proximity to free margins a bilateral rotation flap was deemed most appropriate. Using a sterile surgical marker, an appropriate rotation flap was drawn incorporating the defect and placing the expected incisions within the relaxed skin tension lines where possible. The area thus outlined was incised deep to adipose tissue with a #15 scalpel blade. The skin margins were undermined to an appropriate distance in all directions utilizing iris scissors. Following this, the designed flap was carried over into the primary defect and sutured into place. Spiral Flap Text: The defect edges were debeveled with a #15 scalpel blade.  Given the location of the defect, shape of the defect and the proximity to free margins a spiral flap was deemed most appropriate.  Using a sterile surgical marker, an appropriate rotation flap was drawn incorporating the defect and placing the expected incisions within the relaxed skin tension lines where possible. The area thus outlined was incised deep to adipose tissue with a #15 scalpel blade.  The skin margins were undermined to an appropriate distance in all directions utilizing iris scissors. Staged Advancement Flap Text: The defect edges were debeveled with a #15 scalpel blade.  Given the location of the defect, shape of the defect and the proximity to free margins a staged advancement flap was deemed most appropriate.  Using a sterile surgical marker, an appropriate advancement flap was drawn incorporating the defect and placing the expected incisions within the relaxed skin tension lines where possible. The area thus outlined was incised deep to adipose tissue with a #15 scalpel blade.  The skin margins were undermined to an appropriate distance in all directions utilizing iris scissors. Star Wedge Flap Text: The defect edges were debeveled with a #15 scalpel blade.  Given the location of the defect, shape of the defect and the proximity to free margins a star wedge flap was deemed most appropriate.  Using a sterile surgical marker, an appropriate rotation flap was drawn incorporating the defect and placing the expected incisions within the relaxed skin tension lines where possible. The area thus outlined was incised deep to adipose tissue with a #15 scalpel blade.  The skin margins were undermined to an appropriate distance in all directions utilizing iris scissors. Transposition Flap Text: The defect edges were debeveled with a #15 scalpel blade.  Given the location of the defect and the proximity to free margins a transposition flap was deemed most appropriate.  Using a sterile surgical marker, an appropriate transposition flap was drawn incorporating the defect.    The area thus outlined was incised deep to adipose tissue with a #15 scalpel blade.  The skin margins were undermined to an appropriate distance in all directions utilizing iris scissors. Muscle Hinge Flap Text: The defect edges were debeveled with a #15 scalpel blade.  Given the size, depth and location of the defect and the proximity to free margins a muscle hinge flap was deemed most appropriate.  Using a sterile surgical marker, an appropriate hinge flap was drawn incorporating the defect. The area thus outlined was incised with a #15 scalpel blade.  The skin margins were undermined to an appropriate distance in all directions utilizing iris scissors. Mustarde Flap Text: The defect edges were debeveled with a #15 scalpel blade.  Given the size, depth and location of the defect and the proximity to free margins a Mustarde flap was deemed most appropriate.  Using a sterile surgical marker, an appropriate flap was drawn incorporating the defect. The area thus outlined was incised with a #15 scalpel blade.  The skin margins were undermined to an appropriate distance in all directions utilizing iris scissors. Nasal Turnover Hinge Flap Text: The defect edges were debeveled with a #15 scalpel blade.  Given the size, depth, location of the defect and the defect being full thickness a nasal turnover hinge flap was deemed most appropriate.  Using a sterile surgical marker, an appropriate hinge flap was drawn incorporating the defect. The area thus outlined was incised with a #15 scalpel blade. The flap was designed to recreate the nasal mucosal lining and the alar rim. The skin margins were undermined to an appropriate distance in all directions utilizing iris scissors. Nasalis-Muscle-Based Myocutaneous Island Pedicle Flap Text: Using a #15 blade, an incision was made around the donor flap to the level of the nasalis muscle. Wide lateral undermining was then performed in both the subcutaneous plane above the nasalis muscle, and in a submuscular plane just above periosteum. This allowed the formation of a free nasalis muscle axial pedicle (based on the angular artery) which was still attached to the actual cutaneous flap, increasing its mobility and vascular viability. Hemostasis was obtained with pinpoint electrocoagulation. The flap was mobilized into position and the pivotal anchor points positioned and stabilized with buried interrupted sutures. Subcutaneous and dermal tissues were closed in a multilayered fashion with sutures. Tissue redundancies were excised, and the epidermal edges were apposed without significant tension and sutured with sutures. Nasalis Myocutaneous Flap Text: Using a #15 blade, an incision was made around the donor flap to the level of the nasalis muscle. Wide lateral undermining was then performed in both the subcutaneous plane above the nasalis muscle, and in a submuscular plane just above periosteum. This allowed the formation of a free nasalis muscle axial pedicle which was still attached to the actual cutaneous flap, increasing its mobility and vascular viability. Hemostasis was obtained with pinpoint electrocoagulation. The flap was mobilized into position and the pivotal anchor points positioned and stabilized with buried interrupted sutures. Subcutaneous and dermal tissues were closed in a multilayered fashion with sutures. Tissue redundancies were excised, and the epidermal edges were apposed without significant tension and sutured with sutures. Orbicularis Oris Muscle Flap Text: The defect edges were debeveled with a #15 scalpel blade.  Given that the defect affected the competency of the oral sphincter an orbicularis oris muscle flap was deemed most appropriate to restore this competency and normal muscle function.  Using a sterile surgical marker, an appropriate flap was drawn incorporating the defect. The area thus outlined was incised with a #15 scalpel blade. Melolabial Transposition Flap Text: The defect edges were debeveled with a #15 scalpel blade.  Given the location of the defect and the proximity to free margins a melolabial flap was deemed most appropriate.  Using a sterile surgical marker, an appropriate melolabial transposition flap was drawn incorporating the defect.    The area thus outlined was incised deep to adipose tissue with a #15 scalpel blade.  The skin margins were undermined to an appropriate distance in all directions utilizing iris scissors. Rectangular Flap Text: The defect edges were debeveled with a #15 scalpel blade. Given the location of the defect and the proximity to free margins a rectangular flap was deemed most appropriate. Using a sterile surgical marker, an appropriate rectangular flap was drawn incorporating the defect. The area thus outlined was incised deep to adipose tissue with a #15 scalpel blade. The skin margins were undermined to an appropriate distance in all directions utilizing iris scissors. Following this, the designed flap was carried over into the primary defect and sutured into place. Rhombic Flap Text: The defect edges were debeveled with a #15 scalpel blade.  Given the location of the defect and the proximity to free margins a rhombic flap was deemed most appropriate.  Using a sterile surgical marker, an appropriate rhombic flap was drawn incorporating the defect.    The area thus outlined was incised deep to adipose tissue with a #15 scalpel blade.  The skin margins were undermined to an appropriate distance in all directions utilizing iris scissors. Rhomboid Transposition Flap Text: The defect edges were debeveled with a #15 scalpel blade.  Given the location of the defect and the proximity to free margins a rhomboid transposition flap was deemed most appropriate.  Using a sterile surgical marker, an appropriate rhomboid flap was drawn incorporating the defect.    The area thus outlined was incised deep to adipose tissue with a #15 scalpel blade.  The skin margins were undermined to an appropriate distance in all directions utilizing iris scissors. Bi-Rhombic Flap Text: The defect edges were debeveled with a #15 scalpel blade.  Given the location of the defect and the proximity to free margins a bi-rhombic flap was deemed most appropriate.  Using a sterile surgical marker, an appropriate rhombic flap was drawn incorporating the defect. The area thus outlined was incised deep to adipose tissue with a #15 scalpel blade.  The skin margins were undermined to an appropriate distance in all directions utilizing iris scissors. Helical Rim Advancement Flap Text: The defect edges were debeveled with a #15 blade scalpel.  Given the location of the defect and the proximity to free margins (helical rim) a double helical rim advancement flap was deemed most appropriate.  Using a sterile surgical marker, the appropriate advancement flaps were drawn incorporating the defect and placing the expected incisions between the helical rim and antihelix where possible.  The area thus outlined was incised through and through with a #15 scalpel blade.  With a skin hook and iris scissors, the flaps were gently and sharply undermined and freed up. Bilateral Helical Rim Advancement Flap Text: The defect edges were debeveled with a #15 blade scalpel.  Given the location of the defect and the proximity to free margins (helical rim) a bilateral helical rim advancement flap was deemed most appropriate.  Using a sterile surgical marker, the appropriate advancement flaps were drawn incorporating the defect and placing the expected incisions between the helical rim and antihelix where possible.  The area thus outlined was incised through and through with a #15 scalpel blade.  With a skin hook and iris scissors, the flaps were gently and sharply undermined and freed up. Ear Star Wedge Flap Text: The defect edges were debeveled with a #15 blade scalpel.  Given the location of the defect and the proximity to free margins (helical rim) an ear star wedge flap was deemed most appropriate.  Using a sterile surgical marker, the appropriate flap was drawn incorporating the defect and placing the expected incisions between the helical rim and antihelix where possible.  The area thus outlined was incised through and through with a #15 scalpel blade. Banner Transposition Flap Text: The defect edges were debeveled with a #15 scalpel blade.  Given the location of the defect and the proximity to free margins a Banner transposition flap was deemed most appropriate.  Using a sterile surgical marker, an appropriate flap drawn around the defect. The area thus outlined was incised deep to adipose tissue with a #15 scalpel blade.  The skin margins were undermined to an appropriate distance in all directions utilizing iris scissors. Bilobed Flap Text: The defect edges were debeveled with a #15 scalpel blade.  Given the location of the defect and the proximity to free margins a bilobe flap was deemed most appropriate.  Using a sterile surgical marker, an appropriate bilobe flap drawn around the defect.    The area thus outlined was incised deep to adipose tissue with a #15 scalpel blade.  The skin margins were undermined to an appropriate distance in all directions utilizing iris scissors. Bilobed Transposition Flap Text: The defect edges were debeveled with a #15 scalpel blade.  Given the location of the defect and the proximity to free margins a bilobed transposition flap was deemed most appropriate.  Using a sterile surgical marker, an appropriate bilobe flap drawn around the defect.    The area thus outlined was incised deep to adipose tissue with a #15 scalpel blade.  The skin margins were undermined to an appropriate distance in all directions utilizing iris scissors. Trilobed Flap Text: The defect edges were debeveled with a #15 scalpel blade.  Given the location of the defect and the proximity to free margins a trilobed flap was deemed most appropriate.  Using a sterile surgical marker, an appropriate trilobed flap drawn around the defect.    The area thus outlined was incised deep to adipose tissue with a #15 scalpel blade.  The skin margins were undermined to an appropriate distance in all directions utilizing iris scissors. Dorsal Nasal Flap Text: The defect edges were debeveled with a #15 scalpel blade.  Given the location of the defect and the proximity to free margins a dorsal nasal flap was deemed most appropriate.  Using a sterile surgical marker, an appropriate dorsal nasal flap was drawn around the defect.    The area thus outlined was incised deep to adipose tissue with a #15 scalpel blade.  The skin margins were undermined to an appropriate distance in all directions utilizing iris scissors. Island Pedicle Flap Text: The defect edges were debeveled with a #15 scalpel blade.  Given the location of the defect, shape of the defect and the proximity to free margins an island pedicle advancement flap was deemed most appropriate.  Using a sterile surgical marker, an appropriate advancement flap was drawn incorporating the defect, outlining the appropriate donor tissue and placing the expected incisions within the relaxed skin tension lines where possible.    The area thus outlined was incised deep to adipose tissue with a #15 scalpel blade.  The skin margins were undermined to an appropriate distance in all directions around the primary defect and laterally outward around the island pedicle utilizing iris scissors.  There was minimal undermining beneath the pedicle flap. Island Pedicle Flap With Canthal Suspension Text: The defect edges were debeveled with a #15 scalpel blade.  Given the location of the defect, shape of the defect and the proximity to free margins an island pedicle advancement flap was deemed most appropriate.  Using a sterile surgical marker, an appropriate advancement flap was drawn incorporating the defect, outlining the appropriate donor tissue and placing the expected incisions within the relaxed skin tension lines where possible. The area thus outlined was incised deep to adipose tissue with a #15 scalpel blade.  The skin margins were undermined to an appropriate distance in all directions around the primary defect and laterally outward around the island pedicle utilizing iris scissors.  There was minimal undermining beneath the pedicle flap. A suspension suture was placed in the canthal tendon to prevent tension and prevent ectropion. Alar Island Pedicle Flap Text: The defect edges were debeveled with a #15 scalpel blade.  Given the location of the defect, shape of the defect and the proximity to the alar rim an island pedicle advancement flap was deemed most appropriate.  Using a sterile surgical marker, an appropriate advancement flap was drawn incorporating the defect, outlining the appropriate donor tissue and placing the expected incisions within the nasal ala running parallel to the alar rim. The area thus outlined was incised with a #15 scalpel blade.  The skin margins were undermined minimally to an appropriate distance in all directions around the primary defect and laterally outward around the island pedicle utilizing iris scissors.  There was minimal undermining beneath the pedicle flap. Double Island Pedicle Flap Text: The defect edges were debeveled with a #15 scalpel blade.  Given the location of the defect, shape of the defect and the proximity to free margins a double island pedicle advancement flap was deemed most appropriate.  Using a sterile surgical marker, an appropriate advancement flap was drawn incorporating the defect, outlining the appropriate donor tissue and placing the expected incisions within the relaxed skin tension lines where possible.    The area thus outlined was incised deep to adipose tissue with a #15 scalpel blade.  The skin margins were undermined to an appropriate distance in all directions around the primary defect and laterally outward around the island pedicle utilizing iris scissors.  There was minimal undermining beneath the pedicle flap. Island Pedicle Flap-Requiring Vessel Identification Text: The defect edges were debeveled with a #15 scalpel blade.  Given the location of the defect, shape of the defect and the proximity to free margins an island pedicle advancement flap was deemed most appropriate.  Using a sterile surgical marker, an appropriate advancement flap was drawn, based on the axial vessel mentioned above, incorporating the defect, outlining the appropriate donor tissue and placing the expected incisions within the relaxed skin tension lines where possible.    The area thus outlined was incised deep to adipose tissue with a #15 scalpel blade.  The skin margins were undermined to an appropriate distance in all directions around the primary defect and laterally outward around the island pedicle utilizing iris scissors.  There was minimal undermining beneath the pedicle flap. Keystone Flap Text: The defect edges were debeveled with a #15 scalpel blade.  Given the location of the defect, shape of the defect a keystone flap was deemed most appropriate.  Using a sterile surgical marker, an appropriate keystone flap was drawn incorporating the defect, outlining the appropriate donor tissue and placing the expected incisions within the relaxed skin tension lines where possible. The area thus outlined was incised deep to adipose tissue with a #15 scalpel blade.  The skin margins were undermined to an appropriate distance in all directions around the primary defect and laterally outward around the flap utilizing iris scissors. O-T Plasty Text: The defect edges were debeveled with a #15 scalpel blade.  Given the location of the defect, shape of the defect and the proximity to free margins an O-T plasty was deemed most appropriate.  Using a sterile surgical marker, an appropriate O-T plasty was drawn incorporating the defect and placing the expected incisions within the relaxed skin tension lines where possible.    The area thus outlined was incised deep to adipose tissue with a #15 scalpel blade.  The skin margins were undermined to an appropriate distance in all directions utilizing iris scissors. O-Z Plasty Text: The defect edges were debeveled with a #15 scalpel blade.  Given the location of the defect, shape of the defect and the proximity to free margins an O-Z plasty (double transposition flap) was deemed most appropriate.  Using a sterile surgical marker, the appropriate transposition flaps were drawn incorporating the defect and placing the expected incisions within the relaxed skin tension lines where possible.    The area thus outlined was incised deep to adipose tissue with a #15 scalpel blade.  The skin margins were undermined to an appropriate distance in all directions utilizing iris scissors.  Hemostasis was achieved with electrocautery.  The flaps were then transposed into place, one clockwise and the other counterclockwise, and anchored with interrupted buried subcutaneous sutures. Double O-Z Plasty Text: The defect edges were debeveled with a #15 scalpel blade.  Given the location of the defect, shape of the defect and the proximity to free margins a Double O-Z plasty (double transposition flap) was deemed most appropriate.  Using a sterile surgical marker, the appropriate transposition flaps were drawn incorporating the defect and placing the expected incisions within the relaxed skin tension lines where possible. The area thus outlined was incised deep to adipose tissue with a #15 scalpel blade.  The skin margins were undermined to an appropriate distance in all directions utilizing iris scissors.  Hemostasis was achieved with electrocautery.  The flaps were then transposed into place, one clockwise and the other counterclockwise, and anchored with interrupted buried subcutaneous sutures. V-Y Plasty Text: The defect edges were debeveled with a #15 scalpel blade.  Given the location of the defect, shape of the defect and the proximity to free margins an V-Y advancement flap was deemed most appropriate.  Using a sterile surgical marker, an appropriate advancement flap was drawn incorporating the defect and placing the expected incisions within the relaxed skin tension lines where possible.    The area thus outlined was incised deep to adipose tissue with a #15 scalpel blade.  The skin margins were undermined to an appropriate distance in all directions utilizing iris scissors. H Plasty Text: Given the location of the defect, shape of the defect and the proximity to free margins a H-plasty was deemed most appropriate for repair.  Using a sterile surgical marker, the appropriate advancement arms of the H-plasty were drawn incorporating the defect and placing the expected incisions within the relaxed skin tension lines where possible. The area thus outlined was incised deep to adipose tissue with a #15 scalpel blade. The skin margins were undermined to an appropriate distance in all directions utilizing iris scissors.  The opposing advancement arms were then advanced into place in opposite direction and anchored with interrupted buried subcutaneous sutures. W Plasty Text: The lesion was extirpated to the level of the fat with a #15 scalpel blade.  Given the location of the defect, shape of the defect and the proximity to free margins a W-plasty was deemed most appropriate for repair.  Using a sterile surgical marker, the appropriate transposition arms of the W-plasty were drawn incorporating the defect and placing the expected incisions within the relaxed skin tension lines where possible.    The area thus outlined was incised deep to adipose tissue with a #15 scalpel blade.  The skin margins were undermined to an appropriate distance in all directions utilizing iris scissors.  The opposing transposition arms were then transposed into place in opposite direction and anchored with interrupted buried subcutaneous sutures. Z Plasty Text: The lesion was extirpated to the level of the fat with a #15 scalpel blade.  Given the location of the defect, shape of the defect and the proximity to free margins a Z-plasty was deemed most appropriate for repair.  Using a sterile surgical marker, the appropriate transposition arms of the Z-plasty were drawn incorporating the defect and placing the expected incisions within the relaxed skin tension lines where possible.    The area thus outlined was incised deep to adipose tissue with a #15 scalpel blade.  The skin margins were undermined to an appropriate distance in all directions utilizing iris scissors.  The opposing transposition arms were then transposed into place in opposite direction and anchored with interrupted buried subcutaneous sutures. Double Z Plasty Text: The lesion was extirpated to the level of the fat with a #15 scalpel blade. Given the location of the defect, shape of the defect and the proximity to free margins a double Z-plasty was deemed most appropriate for repair. Using a sterile surgical marker, the appropriate transposition arms of the double Z-plasty were drawn incorporating the defect and placing the expected incisions within the relaxed skin tension lines where possible. The area thus outlined was incised deep to adipose tissue with a #15 scalpel blade. The skin margins were undermined to an appropriate distance in all directions utilizing iris scissors. The opposing transposition arms were then transposed and carried over into place in opposite direction and anchored with interrupted buried subcutaneous sutures. Zygomaticofacial Flap Text: Given the location of the defect, shape of the defect and the proximity to free margins a zygomaticofacial flap was deemed most appropriate for repair.  Using a sterile surgical marker, the appropriate flap was drawn incorporating the defect and placing the expected incisions within the relaxed skin tension lines where possible. The area thus outlined was incised deep to adipose tissue with a #15 scalpel blade with preservation of a vascular pedicle.  The skin margins were undermined to an appropriate distance in all directions utilizing iris scissors.  The flap was then placed into the defect and anchored with interrupted buried subcutaneous sutures. Cheek Interpolation Flap Text: A decision was made to reconstruct the defect utilizing an interpolation axial flap and a staged reconstruction.  A telfa template was made of the defect.  This telfa template was then used to outline the Cheek Interpolation flap.  The donor area for the pedicle flap was then injected with anesthesia.  The flap was excised through the skin and subcutaneous tissue down to the layer of the underlying musculature.  The interpolation flap was carefully excised within this deep plane to maintain its blood supply.  The edges of the donor site were undermined.   The donor site was closed in a primary fashion.  The pedicle was then rotated into position and sutured.  Once the tube was sutured into place, adequate blood supply was confirmed with blanching and refill.  The pedicle was then wrapped with xeroform gauze and dressed appropriately with a telfa and gauze bandage to ensure continued blood supply and protect the attached pedicle. Cheek-To-Nose Interpolation Flap Text: A decision was made to reconstruct the defect utilizing an interpolation axial flap and a staged reconstruction.  A telfa template was made of the defect.  This telfa template was then used to outline the Cheek-To-Nose Interpolation flap.  The donor area for the pedicle flap was then injected with anesthesia.  The flap was excised through the skin and subcutaneous tissue down to the layer of the underlying musculature.  The interpolation flap was carefully excised within this deep plane to maintain its blood supply.  The edges of the donor site were undermined.   The donor site was closed in a primary fashion.  The pedicle was then rotated into position and sutured.  Once the tube was sutured into place, adequate blood supply was confirmed with blanching and refill.  The pedicle was then wrapped with xeroform gauze and dressed appropriately with a telfa and gauze bandage to ensure continued blood supply and protect the attached pedicle. Interpolation Flap Text: A decision was made to reconstruct the defect utilizing an interpolation axial flap and a staged reconstruction.  A telfa template was made of the defect.  This telfa template was then used to outline the interpolation flap.  The donor area for the pedicle flap was then injected with anesthesia.  The flap was excised through the skin and subcutaneous tissue down to the layer of the underlying musculature.  The interpolation flap was carefully excised within this deep plane to maintain its blood supply.  The edges of the donor site were undermined.   The donor site was closed in a primary fashion.  The pedicle was then rotated into position and sutured.  Once the tube was sutured into place, adequate blood supply was confirmed with blanching and refill.  The pedicle was then wrapped with xeroform gauze and dressed appropriately with a telfa and gauze bandage to ensure continued blood supply and protect the attached pedicle. Melolabial Interpolation Flap Text: A decision was made to reconstruct the defect utilizing an interpolation axial flap and a staged reconstruction.  A telfa template was made of the defect.  This telfa template was then used to outline the melolabial interpolation flap.  The donor area for the pedicle flap was then injected with anesthesia.  The flap was excised through the skin and subcutaneous tissue down to the layer of the underlying musculature.  The pedicle flap was carefully excised within this deep plane to maintain its blood supply.  The edges of the donor site were undermined.   The donor site was closed in a primary fashion.  The pedicle was then rotated into position and sutured.  Once the tube was sutured into place, adequate blood supply was confirmed with blanching and refill.  The pedicle was then wrapped with xeroform gauze and dressed appropriately with a telfa and gauze bandage to ensure continued blood supply and protect the attached pedicle. Mastoid Interpolation Flap Text: A decision was made to reconstruct the defect utilizing an interpolation axial flap and a staged reconstruction.  A telfa template was made of the defect.  This telfa template was then used to outline the mastoid interpolation flap.  The donor area for the pedicle flap was then injected with anesthesia.  The flap was excised through the skin and subcutaneous tissue down to the layer of the underlying musculature.  The pedicle flap was carefully excised within this deep plane to maintain its blood supply.  The edges of the donor site were undermined.   The donor site was closed in a primary fashion.  The pedicle was then rotated into position and sutured.  Once the tube was sutured into place, adequate blood supply was confirmed with blanching and refill.  The pedicle was then wrapped with xeroform gauze and dressed appropriately with a telfa and gauze bandage to ensure continued blood supply and protect the attached pedicle. Posterior Auricular Interpolation Flap Text: A decision was made to reconstruct the defect utilizing an interpolation axial flap and a staged reconstruction.  A telfa template was made of the defect.  This telfa template was then used to outline the posterior auricular interpolation flap.  The donor area for the pedicle flap was then injected with anesthesia.  The flap was excised through the skin and subcutaneous tissue down to the layer of the underlying musculature.  The pedicle flap was carefully excised within this deep plane to maintain its blood supply.  The edges of the donor site were undermined.   The donor site was closed in a primary fashion.  The pedicle was then rotated into position and sutured.  Once the tube was sutured into place, adequate blood supply was confirmed with blanching and refill.  The pedicle was then wrapped with xeroform gauze and dressed appropriately with a telfa and gauze bandage to ensure continued blood supply and protect the attached pedicle. Paramedian Forehead Flap Text: A decision was made to reconstruct the defect utilizing an interpolation axial flap and a staged reconstruction.  A telfa template was made of the defect.  This telfa template was then used to outline the paramedian forehead pedicle flap.  The donor area for the pedicle flap was then injected with anesthesia.  The flap was excised through the skin and subcutaneous tissue down to the layer of the underlying musculature.  The pedicle flap was carefully excised within this deep plane to maintain its blood supply.  The edges of the donor site were undermined.   The donor site was closed in a primary fashion.  The pedicle was then rotated into position and sutured.  Once the tube was sutured into place, adequate blood supply was confirmed with blanching and refill.  The pedicle was then wrapped with xeroform gauze and dressed appropriately with a telfa and gauze bandage to ensure continued blood supply and protect the attached pedicle. Abbe Flap (Upper To Lower Lip) Text: The defect of the lower lip was assessed and measured.  Given the location and size of the defect, an Abbe flap was deemed most appropriate. Using a sterile surgical marker, an appropriate Abbe flap was measured and drawn on the upper lip. Local anesthesia was then infiltrated.  A scalpel was then used to incise the upper lip through and through the skin, vermilion, muscle and mucosa, leaving the flap pedicled on the opposite side.  The flap was then rotated and transferred to the lower lip defect.  The flap was then sutured into place with a three layer technique, closing the orbicularis oris muscle layer with subcutaneous buried sutures, followed by a mucosal layer and an epidermal layer. Abbe Flap (Lower To Upper Lip) Text: The defect of the upper lip was assessed and measured.  Given the location and size of the defect, an Abbe flap was deemed most appropriate. Using a sterile surgical marker, an appropriate Abbe flap was measured and drawn on the lower lip. Local anesthesia was then infiltrated. A scalpel was then used to incise the upper lip through and through the skin, vermilion, muscle and mucosa, leaving the flap pedicled on the opposite side.  The flap was then rotated and transferred to the lower lip defect.  The flap was then sutured into place with a three layer technique, closing the orbicularis oris muscle layer with subcutaneous buried sutures, followed by a mucosal layer and an epidermal layer. Estlander Flap (Upper To Lower Lip) Text: The defect of the lower lip was assessed and measured.  Given the location and size of the defect, an Estlander flap was deemed most appropriate. Using a sterile surgical marker, an appropriate Estlander flap was measured and drawn on the upper lip. Local anesthesia was then infiltrated. A scalpel was then used to incise the lateral aspect of the flap, through skin, muscle and mucosa, leaving the flap pedicled medially.  The flap was then rotated and positioned to fill the lower lip defect.  The flap was then sutured into place with a three layer technique, closing the orbicularis oris muscle layer with subcutaneous buried sutures, followed by a mucosal layer and an epidermal layer. Lip Wedge Excision Repair Text: Given the location of the defect and the proximity to free margins a full thickness wedge repair was deemed most appropriate.  Using a sterile surgical marker, the appropriate repair was drawn incorporating the defect and placing the expected incisions perpendicular to the vermilion border.  The vermilion border was also meticulously outlined to ensure appropriate reapproximation during the repair.  The area thus outlined was incised through and through with a #15 scalpel blade.  The muscularis and dermis were reaproximated with deep sutures following hemostasis. Care was taken to realign the vermilion border before proceeding with the superficial closure.  Once the vermilion was realigned the superfical and mucosal closure was finished. Ftsg Text: The defect edges were debeveled with a #15 scalpel blade.  Given the location of the defect, shape of the defect and the proximity to free margins a full thickness skin graft was deemed most appropriate.  Using a sterile surgical marker, the primary defect shape was transferred to the donor site. The area thus outlined was incised deep to adipose tissue with a #15 scalpel blade.  The harvested graft was then trimmed of adipose tissue until only dermis and epidermis was left.  The skin margins of the secondary defect were undermined to an appropriate distance in all directions utilizing iris scissors.  The secondary defect was closed with interrupted buried subcutaneous sutures.  The skin edges were then re-apposed with running  sutures.  The skin graft was then placed in the primary defect and oriented appropriately. Split-Thickness Skin Graft Text: The defect edges were debeveled with a #15 scalpel blade.  Given the location of the defect, shape of the defect and the proximity to free margins a split thickness skin graft was deemed most appropriate.  Using a sterile surgical marker, the primary defect shape was transferred to the donor site. The split thickness graft was then harvested.  The skin graft was then placed in the primary defect and oriented appropriately. Pinch Graft Text: The defect edges were debeveled with a #15 scalpel blade. Given the location of the defect, shape of the defect and the proximity to free margins a pinch graft was deemed most appropriate. Using a sterile surgical marker, the primary defect shape was transferred to the donor site. The area thus outlined was incised deep to adipose tissue with a #15 scalpel blade.  The harvested graft was then trimmed of adipose tissue until only dermis and epidermis was left. The skin margins of the secondary defect were undermined to an appropriate distance in all directions utilizing iris scissors.  The secondary defect was closed with interrupted buried subcutaneous sutures.  The skin edges were then re-apposed with running  sutures.  The skin graft was then placed in the primary defect and oriented appropriately. Burow's Graft Text: The defect edges were debeveled with a #15 scalpel blade.  Given the location of the defect, shape of the defect, the proximity to free margins and the presence of a standing cone deformity a Burow's skin graft was deemed most appropriate. The standing cone was removed and this tissue was then trimmed to the shape of the primary defect. The adipose tissue was also removed until only dermis and epidermis were left.  The skin margins of the secondary defect were undermined to an appropriate distance in all directions utilizing iris scissors.  The secondary defect was closed with interrupted buried subcutaneous sutures.  The skin edges were then re-apposed with running  sutures.  The skin graft was then placed in the primary defect and oriented appropriately. Cartilage Graft Text: The defect edges were debeveled with a #15 scalpel blade.  Given the location of the defect, shape of the defect, the fact the defect involved a full thickness cartilage defect a cartilage graft was deemed most appropriate.  An appropriate donor site was identified, cleansed, and anesthetized. The cartilage graft was then harvested and transferred to the recipient site, oriented appropriately and then sutured into place.  The secondary defect was then repaired using a primary closure. Composite Graft Text: The defect edges were debeveled with a #15 scalpel blade.  Given the location of the defect, shape of the defect, the proximity to free margins and the fact the defect was full thickness a composite graft was deemed most appropriate.  The defect was outline and then transferred to the donor site.  A full thickness graft was then excised from the donor site. The graft was then placed in the primary defect, oriented appropriately and then sutured into place.  The secondary defect was then repaired using a primary closure. Epidermal Autograft Text: The defect edges were debeveled with a #15 scalpel blade.  Given the location of the defect, shape of the defect and the proximity to free margins an epidermal autograft was deemed most appropriate.  Using a sterile surgical marker, the primary defect shape was transferred to the donor site. The epidermal graft was then harvested.  The skin graft was then placed in the primary defect and oriented appropriately. Dermal Autograft Text: The defect edges were debeveled with a #15 scalpel blade.  Given the location of the defect, shape of the defect and the proximity to free margins a dermal autograft was deemed most appropriate.  Using a sterile surgical marker, the primary defect shape was transferred to the donor site. The area thus outlined was incised deep to adipose tissue with a #15 scalpel blade.  The harvested graft was then trimmed of adipose and epidermal tissue until only dermis was left.  The skin graft was then placed in the primary defect and oriented appropriately. Skin Substitute Text: The defect edges were debeveled with a #15 scalpel blade.  Given the location of the defect, shape of the defect and the proximity to free margins a skin substitute graft was deemed most appropriate.  The graft material was trimmed to fit the size of the defect. The graft was then placed in the primary defect and oriented appropriately. Tissue Cultured Epidermal Autograft Text: The defect edges were debeveled with a #15 scalpel blade.  Given the location of the defect, shape of the defect and the proximity to free margins a tissue cultured epidermal autograft was deemed most appropriate.  The graft was then trimmed to fit the size of the defect.  The graft was then placed in the primary defect and oriented appropriately. Xenograft Text: The defect edges were debeveled with a #15 scalpel blade.  Given the location of the defect, shape of the defect and the proximity to free margins a xenograft was deemed most appropriate.  The graft was then trimmed to fit the size of the defect.  The graft was then placed in the primary defect and oriented appropriately. Purse String (Intermediate) Text: Given the location of the defect and the characteristics of the surrounding skin a purse string intermediate closure was deemed most appropriate.  Undermining was performed circumferentially around the surgical defect.  A purse string suture was then placed and tightened. Purse String (Simple) Text: Given the location of the defect and the characteristics of the surrounding skin a purse string simple closure was deemed most appropriate.  Undermining was performed circumferentially around the surgical defect.  A purse string suture was then placed and tightened. Complex Repair And Single Advancement Flap Text: The defect edges were debeveled with a #15 scalpel blade.  The primary defect was closed partially with a complex linear closure.  Given the location of the remaining defect, shape of the defect and the proximity to free margins a single advancement flap was deemed most appropriate for complete closure of the defect.  Using a sterile surgical marker, an appropriate advancement flap was drawn incorporating the defect and placing the expected incisions within the relaxed skin tension lines where possible.    The area thus outlined was incised deep to adipose tissue with a #15 scalpel blade.  The skin margins were undermined to an appropriate distance in all directions utilizing iris scissors. Complex Repair And Double Advancement Flap Text: The defect edges were debeveled with a #15 scalpel blade.  The primary defect was closed partially with a complex linear closure.  Given the location of the remaining defect, shape of the defect and the proximity to free margins a double advancement flap was deemed most appropriate for complete closure of the defect.  Using a sterile surgical marker, an appropriate advancement flap was drawn incorporating the defect and placing the expected incisions within the relaxed skin tension lines where possible.    The area thus outlined was incised deep to adipose tissue with a #15 scalpel blade.  The skin margins were undermined to an appropriate distance in all directions utilizing iris scissors. Complex Repair And Modified Advancement Flap Text: The defect edges were debeveled with a #15 scalpel blade.  The primary defect was closed partially with a complex linear closure.  Given the location of the remaining defect, shape of the defect and the proximity to free margins a modified advancement flap was deemed most appropriate for complete closure of the defect.  Using a sterile surgical marker, an appropriate advancement flap was drawn incorporating the defect and placing the expected incisions within the relaxed skin tension lines where possible.    The area thus outlined was incised deep to adipose tissue with a #15 scalpel blade.  The skin margins were undermined to an appropriate distance in all directions utilizing iris scissors. Complex Repair And A-T Advancement Flap Text: The defect edges were debeveled with a #15 scalpel blade.  The primary defect was closed partially with a complex linear closure.  Given the location of the remaining defect, shape of the defect and the proximity to free margins an A-T advancement flap was deemed most appropriate for complete closure of the defect.  Using a sterile surgical marker, an appropriate advancement flap was drawn incorporating the defect and placing the expected incisions within the relaxed skin tension lines where possible.    The area thus outlined was incised deep to adipose tissue with a #15 scalpel blade.  The skin margins were undermined to an appropriate distance in all directions utilizing iris scissors. Complex Repair And O-T Advancement Flap Text: The defect edges were debeveled with a #15 scalpel blade.  The primary defect was closed partially with a complex linear closure.  Given the location of the remaining defect, shape of the defect and the proximity to free margins an O-T advancement flap was deemed most appropriate for complete closure of the defect.  Using a sterile surgical marker, an appropriate advancement flap was drawn incorporating the defect and placing the expected incisions within the relaxed skin tension lines where possible.    The area thus outlined was incised deep to adipose tissue with a #15 scalpel blade.  The skin margins were undermined to an appropriate distance in all directions utilizing iris scissors. Complex Repair And O-L Flap Text: The defect edges were debeveled with a #15 scalpel blade.  The primary defect was closed partially with a complex linear closure.  Given the location of the remaining defect, shape of the defect and the proximity to free margins an O-L flap was deemed most appropriate for complete closure of the defect.  Using a sterile surgical marker, an appropriate flap was drawn incorporating the defect and placing the expected incisions within the relaxed skin tension lines where possible.    The area thus outlined was incised deep to adipose tissue with a #15 scalpel blade.  The skin margins were undermined to an appropriate distance in all directions utilizing iris scissors. Complex Repair And Bilobe Flap Text: The defect edges were debeveled with a #15 scalpel blade.  The primary defect was closed partially with a complex linear closure.  Given the location of the remaining defect, shape of the defect and the proximity to free margins a bilobe flap was deemed most appropriate for complete closure of the defect.  Using a sterile surgical marker, an appropriate advancement flap was drawn incorporating the defect and placing the expected incisions within the relaxed skin tension lines where possible.    The area thus outlined was incised deep to adipose tissue with a #15 scalpel blade.  The skin margins were undermined to an appropriate distance in all directions utilizing iris scissors. Complex Repair And Melolabial Flap Text: The defect edges were debeveled with a #15 scalpel blade.  The primary defect was closed partially with a complex linear closure.  Given the location of the remaining defect, shape of the defect and the proximity to free margins a melolabial flap was deemed most appropriate for complete closure of the defect.  Using a sterile surgical marker, an appropriate advancement flap was drawn incorporating the defect and placing the expected incisions within the relaxed skin tension lines where possible.    The area thus outlined was incised deep to adipose tissue with a #15 scalpel blade.  The skin margins were undermined to an appropriate distance in all directions utilizing iris scissors. Complex Repair And Rotation Flap Text: The defect edges were debeveled with a #15 scalpel blade.  The primary defect was closed partially with a complex linear closure.  Given the location of the remaining defect, shape of the defect and the proximity to free margins a rotation flap was deemed most appropriate for complete closure of the defect.  Using a sterile surgical marker, an appropriate advancement flap was drawn incorporating the defect and placing the expected incisions within the relaxed skin tension lines where possible.    The area thus outlined was incised deep to adipose tissue with a #15 scalpel blade.  The skin margins were undermined to an appropriate distance in all directions utilizing iris scissors. Complex Repair And Rhombic Flap Text: The defect edges were debeveled with a #15 scalpel blade.  The primary defect was closed partially with a complex linear closure.  Given the location of the remaining defect, shape of the defect and the proximity to free margins a rhombic flap was deemed most appropriate for complete closure of the defect.  Using a sterile surgical marker, an appropriate advancement flap was drawn incorporating the defect and placing the expected incisions within the relaxed skin tension lines where possible.    The area thus outlined was incised deep to adipose tissue with a #15 scalpel blade.  The skin margins were undermined to an appropriate distance in all directions utilizing iris scissors. Complex Repair And Transposition Flap Text: The defect edges were debeveled with a #15 scalpel blade.  The primary defect was closed partially with a complex linear closure.  Given the location of the remaining defect, shape of the defect and the proximity to free margins a transposition flap was deemed most appropriate for complete closure of the defect.  Using a sterile surgical marker, an appropriate advancement flap was drawn incorporating the defect and placing the expected incisions within the relaxed skin tension lines where possible.    The area thus outlined was incised deep to adipose tissue with a #15 scalpel blade.  The skin margins were undermined to an appropriate distance in all directions utilizing iris scissors. Complex Repair And V-Y Plasty Text: The defect edges were debeveled with a #15 scalpel blade.  The primary defect was closed partially with a complex linear closure.  Given the location of the remaining defect, shape of the defect and the proximity to free margins a V-Y plasty was deemed most appropriate for complete closure of the defect.  Using a sterile surgical marker, an appropriate advancement flap was drawn incorporating the defect and placing the expected incisions within the relaxed skin tension lines where possible.    The area thus outlined was incised deep to adipose tissue with a #15 scalpel blade.  The skin margins were undermined to an appropriate distance in all directions utilizing iris scissors. Complex Repair And M Plasty Text: The defect edges were debeveled with a #15 scalpel blade.  The primary defect was closed partially with a complex linear closure.  Given the location of the remaining defect, shape of the defect and the proximity to free margins an M plasty was deemed most appropriate for complete closure of the defect.  Using a sterile surgical marker, an appropriate advancement flap was drawn incorporating the defect and placing the expected incisions within the relaxed skin tension lines where possible.    The area thus outlined was incised deep to adipose tissue with a #15 scalpel blade.  The skin margins were undermined to an appropriate distance in all directions utilizing iris scissors. Complex Repair And Double M Plasty Text: The defect edges were debeveled with a #15 scalpel blade.  The primary defect was closed partially with a complex linear closure.  Given the location of the remaining defect, shape of the defect and the proximity to free margins a double M plasty was deemed most appropriate for complete closure of the defect.  Using a sterile surgical marker, an appropriate advancement flap was drawn incorporating the defect and placing the expected incisions within the relaxed skin tension lines where possible.    The area thus outlined was incised deep to adipose tissue with a #15 scalpel blade.  The skin margins were undermined to an appropriate distance in all directions utilizing iris scissors. Complex Repair And W Plasty Text: The defect edges were debeveled with a #15 scalpel blade.  The primary defect was closed partially with a complex linear closure.  Given the location of the remaining defect, shape of the defect and the proximity to free margins a W plasty was deemed most appropriate for complete closure of the defect.  Using a sterile surgical marker, an appropriate advancement flap was drawn incorporating the defect and placing the expected incisions within the relaxed skin tension lines where possible.    The area thus outlined was incised deep to adipose tissue with a #15 scalpel blade.  The skin margins were undermined to an appropriate distance in all directions utilizing iris scissors. Complex Repair And Z Plasty Text: The defect edges were debeveled with a #15 scalpel blade.  The primary defect was closed partially with a complex linear closure.  Given the location of the remaining defect, shape of the defect and the proximity to free margins a Z plasty was deemed most appropriate for complete closure of the defect.  Using a sterile surgical marker, an appropriate advancement flap was drawn incorporating the defect and placing the expected incisions within the relaxed skin tension lines where possible.    The area thus outlined was incised deep to adipose tissue with a #15 scalpel blade.  The skin margins were undermined to an appropriate distance in all directions utilizing iris scissors. Complex Repair And Dorsal Nasal Flap Text: The defect edges were debeveled with a #15 scalpel blade.  The primary defect was closed partially with a complex linear closure.  Given the location of the remaining defect, shape of the defect and the proximity to free margins a dorsal nasal flap was deemed most appropriate for complete closure of the defect.  Using a sterile surgical marker, an appropriate flap was drawn incorporating the defect and placing the expected incisions within the relaxed skin tension lines where possible.    The area thus outlined was incised deep to adipose tissue with a #15 scalpel blade.  The skin margins were undermined to an appropriate distance in all directions utilizing iris scissors. Complex Repair And Ftsg Text: The defect edges were debeveled with a #15 scalpel blade.  The primary defect was closed partially with a complex linear closure.  Given the location of the defect, shape of the defect and the proximity to free margins a full thickness skin graft was deemed most appropriate to repair the remaining defect.  The graft was trimmed to fit the size of the remaining defect.  The graft was then placed in the primary defect, oriented appropriately, and sutured into place. Complex Repair And Burow's Graft Text: The defect edges were debeveled with a #15 scalpel blade.  The primary defect was closed partially with a complex linear closure.  Given the location of the defect, shape of the defect, the proximity to free margins and the presence of a standing cone deformity a Burow's graft was deemed most appropriate to repair the remaining defect.  The graft was trimmed to fit the size of the remaining defect.  The graft was then placed in the primary defect, oriented appropriately, and sutured into place. Complex Repair And Split-Thickness Skin Graft Text: The defect edges were debeveled with a #15 scalpel blade.  The primary defect was closed partially with a complex linear closure.  Given the location of the defect, shape of the defect and the proximity to free margins a split thickness skin graft was deemed most appropriate to repair the remaining defect.  The graft was trimmed to fit the size of the remaining defect.  The graft was then placed in the primary defect, oriented appropriately, and sutured into place. Complex Repair And Epidermal Autograft Text: The defect edges were debeveled with a #15 scalpel blade.  The primary defect was closed partially with a complex linear closure.  Given the location of the defect, shape of the defect and the proximity to free margins an epidermal autograft was deemed most appropriate to repair the remaining defect.  The graft was trimmed to fit the size of the remaining defect.  The graft was then placed in the primary defect, oriented appropriately, and sutured into place. Complex Repair And Dermal Autograft Text: The defect edges were debeveled with a #15 scalpel blade.  The primary defect was closed partially with a complex linear closure.  Given the location of the defect, shape of the defect and the proximity to free margins an dermal autograft was deemed most appropriate to repair the remaining defect.  The graft was trimmed to fit the size of the remaining defect.  The graft was then placed in the primary defect, oriented appropriately, and sutured into place. Complex Repair And Tissue Cultured Epidermal Autograft Text: The defect edges were debeveled with a #15 scalpel blade.  The primary defect was closed partially with a complex linear closure.  Given the location of the defect, shape of the defect and the proximity to free margins an tissue cultured epidermal autograft was deemed most appropriate to repair the remaining defect.  The graft was trimmed to fit the size of the remaining defect.  The graft was then placed in the primary defect, oriented appropriately, and sutured into place. Complex Repair And Xenograft Text: The defect edges were debeveled with a #15 scalpel blade.  The primary defect was closed partially with a complex linear closure.  Given the location of the defect, shape of the defect and the proximity to free margins a xenograft was deemed most appropriate to repair the remaining defect.  The graft was trimmed to fit the size of the remaining defect.  The graft was then placed in the primary defect, oriented appropriately, and sutured into place. Complex Repair And Skin Substitute Graft Text: The defect edges were debeveled with a #15 scalpel blade.  The primary defect was closed partially with a complex linear closure.  Given the location of the remaining defect, shape of the defect and the proximity to free margins a skin substitute graft was deemed most appropriate to repair the remaining defect.  The graft was trimmed to fit the size of the remaining defect.  The graft was then placed in the primary defect, oriented appropriately, and sutured into place. Path Notes (To The Dermatopathologist): No previous path Consent was obtained from the patient. The risks and benefits to therapy were discussed in detail. Specifically, the risks of infection, scarring, bleeding, prolonged wound healing, incomplete removal, allergy to anesthesia, nerve injury and recurrence were addressed. Prior to the procedure, the treatment site was clearly identified and confirmed by the patient. All components of Universal Protocol/PAUSE Rule completed. Post-Care Instructions: I reviewed with the patient in detail post-care instructions. Patient is not to engage in any heavy lifting, exercise, or swimming for the next 14 days. Should the patient develop any fevers, chills, bleeding, severe pain patient will contact the office immediately. Home Suture Removal Text: Patient was provided a home suture removal kit and will remove their sutures at home.  If they have any questions or difficulties they will call the office. Show Asc Variables: Yes Where Do You Want The Question To Include Opioid Counseling Located?: Case Summary Tab Billing Type: Third-Party Bill Information: Selecting Yes will display possible errors in your note based on the variables you have selected. This validation is only offered as a suggestion for you. PLEASE NOTE THAT THE VALIDATION TEXT WILL BE REMOVED WHEN YOU FINALIZE YOUR NOTE. IF YOU WANT TO FAX A PRELIMINARY NOTE YOU WILL NEED TO TOGGLE THIS TO 'NO' IF YOU DO NOT WANT IT IN YOUR FAXED NOTE.
